# Patient Record
Sex: FEMALE | Race: WHITE | NOT HISPANIC OR LATINO
[De-identification: names, ages, dates, MRNs, and addresses within clinical notes are randomized per-mention and may not be internally consistent; named-entity substitution may affect disease eponyms.]

---

## 2018-04-23 ENCOUNTER — RX RENEWAL (OUTPATIENT)
Age: 83
End: 2018-04-23

## 2018-04-23 DIAGNOSIS — Z86.79 PERSONAL HISTORY OF OTHER DISEASES OF THE CIRCULATORY SYSTEM: ICD-10-CM

## 2018-04-23 PROBLEM — F32.9 DEPRESSION: Status: ACTIVE | Noted: 2018-04-23

## 2018-04-23 PROBLEM — I34.1 MVP (MITRAL VALVE PROLAPSE): Status: ACTIVE | Noted: 2018-04-23

## 2018-04-23 PROBLEM — F41.9 ANXIETY: Status: ACTIVE | Noted: 2018-04-23

## 2018-04-23 PROBLEM — Z86.69 HISTORY OF CATARACT: Status: RESOLVED | Noted: 2018-04-23 | Resolved: 2018-04-23

## 2018-04-23 PROBLEM — R55 SYNCOPE AND COLLAPSE: Status: RESOLVED | Noted: 2018-04-23 | Resolved: 2018-04-23

## 2018-04-23 RX ORDER — ROSUVASTATIN CALCIUM 5 MG/1
5 TABLET, FILM COATED ORAL DAILY
Refills: 0 | Status: ACTIVE | COMMUNITY
Start: 2017-09-14

## 2018-04-24 ENCOUNTER — APPOINTMENT (OUTPATIENT)
Dept: ELECTROPHYSIOLOGY | Facility: CLINIC | Age: 83
End: 2018-04-24
Payer: MEDICARE

## 2018-04-24 ENCOUNTER — OUTPATIENT (OUTPATIENT)
Dept: OUTPATIENT SERVICES | Facility: HOSPITAL | Age: 83
LOS: 1 days | Discharge: HOME | End: 2018-04-24
Payer: MEDICARE

## 2018-04-24 VITALS
OXYGEN SATURATION: 97 % | SYSTOLIC BLOOD PRESSURE: 119 MMHG | DIASTOLIC BLOOD PRESSURE: 78 MMHG | HEIGHT: 60 IN | WEIGHT: 150 LBS | HEART RATE: 60 BPM | BODY MASS INDEX: 29.45 KG/M2

## 2018-04-24 DIAGNOSIS — F41.9 ANXIETY DISORDER, UNSPECIFIED: ICD-10-CM

## 2018-04-24 DIAGNOSIS — Z95.0 PRESENCE OF CARDIAC PACEMAKER: ICD-10-CM

## 2018-04-24 DIAGNOSIS — Z86.69 PERSONAL HISTORY OF OTHER DISEASES OF THE NERVOUS SYSTEM AND SENSE ORGANS: ICD-10-CM

## 2018-04-24 DIAGNOSIS — I49.5 SICK SINUS SYNDROME: ICD-10-CM

## 2018-04-24 DIAGNOSIS — F32.9 MAJOR DEPRESSIVE DISORDER, SINGLE EPISODE, UNSPECIFIED: ICD-10-CM

## 2018-04-24 DIAGNOSIS — Z45.010 ENCOUNTER FOR CHECKING AND TESTING OF CARDIAC PACEMAKER PULSE GENERATOR [BATTERY]: ICD-10-CM

## 2018-04-24 DIAGNOSIS — I34.1 NONRHEUMATIC MITRAL (VALVE) PROLAPSE: ICD-10-CM

## 2018-04-24 DIAGNOSIS — R55 SYNCOPE AND COLLAPSE: ICD-10-CM

## 2018-04-24 PROCEDURE — 93010 ELECTROCARDIOGRAM REPORT: CPT

## 2018-04-24 PROCEDURE — 93000 ELECTROCARDIOGRAM COMPLETE: CPT | Mod: 59

## 2018-04-24 PROCEDURE — 99205 OFFICE O/P NEW HI 60 MIN: CPT

## 2018-04-24 PROCEDURE — 33228 REMV&REPLC PM GEN DUAL LEAD: CPT

## 2018-04-24 RX ORDER — SODIUM CHLORIDE 9 MG/ML
3 INJECTION INTRAMUSCULAR; INTRAVENOUS; SUBCUTANEOUS EVERY 8 HOURS
Qty: 0 | Refills: 0 | Status: DISCONTINUED | OUTPATIENT
Start: 2018-04-24 | End: 2018-05-09

## 2018-04-24 RX ORDER — ASPIRIN/CALCIUM CARB/MAGNESIUM 324 MG
1 TABLET ORAL
Qty: 0 | Refills: 0 | COMMUNITY

## 2018-04-24 RX ORDER — SERTRALINE 25 MG/1
225 TABLET, FILM COATED ORAL
Qty: 0 | Refills: 0 | COMMUNITY

## 2018-04-24 NOTE — H&P CARDIOLOGY - FAMILY HISTORY
Father  Still living? No  Family history of stomach cancer, Age at diagnosis: Age Unknown     Child  Still living? Yes, Estimated age: Age Unknown  Family history of malignant melanoma of skin, Age at diagnosis: Age Unknown  Family history of breast cancer, Age at diagnosis: Age Unknown     Sibling  Still living? Unknown  Family history of prostate cancer, Age at diagnosis: Age Unknown  Family history of non-Hodgkin's lymphoma, Age at diagnosis: Age Unknown

## 2018-04-24 NOTE — H&P CARDIOLOGY - PMH
Age-related macular degeneration  right eye  Appendicitis    History Cardiac arrhythmia    HTN    Hyperlipidemia    Irritable Bowel    Mitral valve prolapse  10-15 years ago  Shoulder sprain  right  Vertigo

## 2018-04-24 NOTE — H&P CARDIOLOGY - PSH
After cataract, bilateral    Cardiac pacemaker  7/9/10  Ovarian Cystectomy    S/P appendectomy    S/P knee replacement  right  2012

## 2018-04-24 NOTE — H&P CARDIOLOGY - HISTORY OF PRESENT ILLNESS
89 y/o woman with Hx of HTN, HLD, MVP, anxiety/depression, syncope, and sinus node dysfunction s/p dual chamber PPM placement who presents today for PPM gen change. See hard copy H&P from 4/24/18 in chart

## 2018-04-24 NOTE — CHART NOTE - NSCHARTNOTEFT_GEN_A_CORE
Diagnosis: Complete heart block  Operation: PPM generator change  Findings: The dual chamber PPM was disconnected and reconnected to a new generator.  Complications: None  Blood loss: <30cc  : Dr. GHULAM Newman

## 2018-04-26 PROBLEM — Z45.010 ELECTIVE REPLACEMENT INDICATED FOR CARDIAC PACEMAKER BATTERY AT END OF LIFESPAN: Status: ACTIVE | Noted: 2018-04-23

## 2018-04-26 PROBLEM — Z95.0 CARDIAC PACEMAKER: Status: ACTIVE | Noted: 2018-04-26

## 2018-04-26 RX ORDER — METOPROLOL SUCCINATE 25 MG/1
25 TABLET, EXTENDED RELEASE ORAL
Qty: 30 | Refills: 2 | Status: ACTIVE | COMMUNITY
Start: 2017-09-12

## 2018-04-29 ENCOUNTER — NON-APPOINTMENT (OUTPATIENT)
Age: 83
End: 2018-04-29

## 2018-05-10 ENCOUNTER — APPOINTMENT (OUTPATIENT)
Dept: ELECTROPHYSIOLOGY | Facility: CLINIC | Age: 83
End: 2018-05-10
Payer: MEDICARE

## 2018-05-10 VITALS — RESPIRATION RATE: 16 BRPM | SYSTOLIC BLOOD PRESSURE: 83 MMHG | HEART RATE: 60 BPM | DIASTOLIC BLOOD PRESSURE: 65 MMHG

## 2018-05-10 PROCEDURE — 99024 POSTOP FOLLOW-UP VISIT: CPT

## 2018-05-13 ENCOUNTER — TRANSCRIPTION ENCOUNTER (OUTPATIENT)
Age: 83
End: 2018-05-13

## 2018-05-29 ENCOUNTER — INPATIENT (INPATIENT)
Facility: HOSPITAL | Age: 83
LOS: 3 days | Discharge: NURSING, REHAB OR LONG-TERM CARE | DRG: 536 | End: 2018-06-02
Attending: ORTHOPAEDIC SURGERY | Admitting: ORTHOPAEDIC SURGERY
Payer: MEDICARE

## 2018-05-29 VITALS
DIASTOLIC BLOOD PRESSURE: 71 MMHG | HEART RATE: 64 BPM | RESPIRATION RATE: 16 BRPM | TEMPERATURE: 98 F | OXYGEN SATURATION: 99 % | SYSTOLIC BLOOD PRESSURE: 162 MMHG

## 2018-05-29 PROCEDURE — 70450 CT HEAD/BRAIN W/O DYE: CPT | Mod: 26

## 2018-05-29 PROCEDURE — 71045 X-RAY EXAM CHEST 1 VIEW: CPT | Mod: 26

## 2018-05-29 PROCEDURE — 73562 X-RAY EXAM OF KNEE 3: CPT | Mod: 26,RT

## 2018-05-29 PROCEDURE — 73502 X-RAY EXAM HIP UNI 2-3 VIEWS: CPT | Mod: 26,RT

## 2018-05-29 PROCEDURE — 70486 CT MAXILLOFACIAL W/O DYE: CPT | Mod: 26

## 2018-05-29 PROCEDURE — 72125 CT NECK SPINE W/O DYE: CPT | Mod: 26

## 2018-05-29 PROCEDURE — 99285 EMERGENCY DEPT VISIT HI MDM: CPT

## 2018-05-29 PROCEDURE — 73590 X-RAY EXAM OF LOWER LEG: CPT | Mod: 26,RT

## 2018-05-29 RX ORDER — ACETAMINOPHEN 500 MG
1000 TABLET ORAL ONCE
Qty: 0 | Refills: 0 | Status: COMPLETED | OUTPATIENT
Start: 2018-05-29 | End: 2018-05-29

## 2018-05-29 RX ORDER — TETANUS TOXOID, REDUCED DIPHTHERIA TOXOID AND ACELLULAR PERTUSSIS VACCINE, ADSORBED 5; 2.5; 8; 8; 2.5 [IU]/.5ML; [IU]/.5ML; UG/.5ML; UG/.5ML; UG/.5ML
0.5 SUSPENSION INTRAMUSCULAR ONCE
Qty: 0 | Refills: 0 | Status: COMPLETED | OUTPATIENT
Start: 2018-05-29 | End: 2018-05-29

## 2018-05-29 RX ADMIN — Medication 400 MILLIGRAM(S): at 21:20

## 2018-05-29 RX ADMIN — TETANUS TOXOID, REDUCED DIPHTHERIA TOXOID AND ACELLULAR PERTUSSIS VACCINE, ADSORBED 0.5 MILLILITER(S): 5; 2.5; 8; 8; 2.5 SUSPENSION INTRAMUSCULAR at 21:39

## 2018-05-29 NOTE — ED ADULT NURSE NOTE - OBJECTIVE STATEMENT
89 year old female patient BIBA in c-collar with head injury on ASA s/p trip and fall in kitchen. Patient states she was walking when her leg got caught on a chair and she fell down, hitting her hear. Patient denies LOC, and pressed her life alert button. EMS reports patient on floor for approximately 20 minutes prior to arrival. Patient has laceration noted above R eye - with bleeding controlled after bandaged by EMS. Patient A&Ox3, able to recall events leading up to fall. Skin tear noted to R knee, bleeding controlled. Unknown last teatanus. Patient denies CP, SOB, abd pain, n/v/d, fever, chills. Denies numbness or tingling - Able to move all extremities without difficulty. VSS. Family at bedside aware of plan of care.

## 2018-05-30 DIAGNOSIS — S72.009A FRACTURE OF UNSPECIFIED PART OF NECK OF UNSPECIFIED FEMUR, INITIAL ENCOUNTER FOR CLOSED FRACTURE: ICD-10-CM

## 2018-05-30 DIAGNOSIS — S43.409A UNSPECIFIED SPRAIN OF UNSPECIFIED SHOULDER JOINT, INITIAL ENCOUNTER: ICD-10-CM

## 2018-05-30 DIAGNOSIS — K58.9 IRRITABLE BOWEL SYNDROME WITHOUT DIARRHEA: ICD-10-CM

## 2018-05-30 DIAGNOSIS — I34.1 NONRHEUMATIC MITRAL (VALVE) PROLAPSE: ICD-10-CM

## 2018-05-30 LAB
24R-OH-CALCIDIOL SERPL-MCNC: 29.2 NG/ML — LOW (ref 30–80)
ALBUMIN SERPL ELPH-MCNC: 4.3 G/DL — SIGNIFICANT CHANGE UP (ref 3.3–5)
ALP SERPL-CCNC: 87 U/L — SIGNIFICANT CHANGE UP (ref 40–120)
ALT FLD-CCNC: 11 U/L — SIGNIFICANT CHANGE UP (ref 10–45)
ANION GAP SERPL CALC-SCNC: 13 MMOL/L — SIGNIFICANT CHANGE UP (ref 5–17)
ANION GAP SERPL CALC-SCNC: 14 MMOL/L — SIGNIFICANT CHANGE UP (ref 5–17)
APPEARANCE UR: CLEAR — SIGNIFICANT CHANGE UP
APTT BLD: 31.6 SEC — SIGNIFICANT CHANGE UP (ref 27.5–37.4)
AST SERPL-CCNC: 21 U/L — SIGNIFICANT CHANGE UP (ref 10–40)
BASOPHILS # BLD AUTO: 0 K/UL — SIGNIFICANT CHANGE UP (ref 0–0.2)
BASOPHILS NFR BLD AUTO: 0.5 % — SIGNIFICANT CHANGE UP (ref 0–2)
BILIRUB SERPL-MCNC: 0.4 MG/DL — SIGNIFICANT CHANGE UP (ref 0.2–1.2)
BILIRUB UR-MCNC: NEGATIVE — SIGNIFICANT CHANGE UP
BLD GP AB SCN SERPL QL: NEGATIVE — SIGNIFICANT CHANGE UP
BUN SERPL-MCNC: 46 MG/DL — HIGH (ref 7–23)
BUN SERPL-MCNC: 46 MG/DL — HIGH (ref 7–23)
CALCIUM SERPL-MCNC: 9.5 MG/DL — SIGNIFICANT CHANGE UP (ref 8.4–10.5)
CALCIUM SERPL-MCNC: 9.7 MG/DL — SIGNIFICANT CHANGE UP (ref 8.4–10.5)
CHLORIDE SERPL-SCNC: 100 MMOL/L — SIGNIFICANT CHANGE UP (ref 96–108)
CHLORIDE SERPL-SCNC: 98 MMOL/L — SIGNIFICANT CHANGE UP (ref 96–108)
CO2 SERPL-SCNC: 23 MMOL/L — SIGNIFICANT CHANGE UP (ref 22–31)
CO2 SERPL-SCNC: 26 MMOL/L — SIGNIFICANT CHANGE UP (ref 22–31)
COLOR SPEC: YELLOW — SIGNIFICANT CHANGE UP
CREAT SERPL-MCNC: 1.26 MG/DL — SIGNIFICANT CHANGE UP (ref 0.5–1.3)
CREAT SERPL-MCNC: 1.48 MG/DL — HIGH (ref 0.5–1.3)
DIFF PNL FLD: NEGATIVE — SIGNIFICANT CHANGE UP
EOSINOPHIL # BLD AUTO: 0.2 K/UL — SIGNIFICANT CHANGE UP (ref 0–0.5)
EOSINOPHIL NFR BLD AUTO: 2.9 % — SIGNIFICANT CHANGE UP (ref 0–6)
GLUCOSE SERPL-MCNC: 113 MG/DL — HIGH (ref 70–99)
GLUCOSE SERPL-MCNC: 120 MG/DL — HIGH (ref 70–99)
GLUCOSE UR QL: NEGATIVE — SIGNIFICANT CHANGE UP
HCT VFR BLD CALC: 32.6 % — LOW (ref 34.5–45)
HCT VFR BLD CALC: 35.3 % — SIGNIFICANT CHANGE UP (ref 34.5–45)
HGB BLD-MCNC: 11.1 G/DL — LOW (ref 11.5–15.5)
HGB BLD-MCNC: 12.1 G/DL — SIGNIFICANT CHANGE UP (ref 11.5–15.5)
INR BLD: 1.05 RATIO — SIGNIFICANT CHANGE UP (ref 0.88–1.16)
INR BLD: 1.08 RATIO — SIGNIFICANT CHANGE UP (ref 0.88–1.16)
KETONES UR-MCNC: NEGATIVE — SIGNIFICANT CHANGE UP
LEUKOCYTE ESTERASE UR-ACNC: ABNORMAL
LYMPHOCYTES # BLD AUTO: 1.2 K/UL — SIGNIFICANT CHANGE UP (ref 1–3.3)
LYMPHOCYTES # BLD AUTO: 22 % — SIGNIFICANT CHANGE UP (ref 13–44)
MCHC RBC-ENTMCNC: 29.7 PG — SIGNIFICANT CHANGE UP (ref 27–34)
MCHC RBC-ENTMCNC: 29.9 PG — SIGNIFICANT CHANGE UP (ref 27–34)
MCHC RBC-ENTMCNC: 33.9 GM/DL — SIGNIFICANT CHANGE UP (ref 32–36)
MCHC RBC-ENTMCNC: 34.1 GM/DL — SIGNIFICANT CHANGE UP (ref 32–36)
MCV RBC AUTO: 87.5 FL — SIGNIFICANT CHANGE UP (ref 80–100)
MCV RBC AUTO: 87.8 FL — SIGNIFICANT CHANGE UP (ref 80–100)
MONOCYTES # BLD AUTO: 0.3 K/UL — SIGNIFICANT CHANGE UP (ref 0–0.9)
MONOCYTES NFR BLD AUTO: 6.1 % — SIGNIFICANT CHANGE UP (ref 2–14)
NEUTROPHILS # BLD AUTO: 3.8 K/UL — SIGNIFICANT CHANGE UP (ref 1.8–7.4)
NEUTROPHILS NFR BLD AUTO: 68.5 % — SIGNIFICANT CHANGE UP (ref 43–77)
NITRITE UR-MCNC: NEGATIVE — SIGNIFICANT CHANGE UP
PH UR: 6 — SIGNIFICANT CHANGE UP (ref 5–8)
PLATELET # BLD AUTO: 117 K/UL — LOW (ref 150–400)
PLATELET # BLD AUTO: 131 K/UL — LOW (ref 150–400)
POTASSIUM SERPL-MCNC: 3.9 MMOL/L — SIGNIFICANT CHANGE UP (ref 3.5–5.3)
POTASSIUM SERPL-MCNC: 4.3 MMOL/L — SIGNIFICANT CHANGE UP (ref 3.5–5.3)
POTASSIUM SERPL-SCNC: 3.9 MMOL/L — SIGNIFICANT CHANGE UP (ref 3.5–5.3)
POTASSIUM SERPL-SCNC: 4.3 MMOL/L — SIGNIFICANT CHANGE UP (ref 3.5–5.3)
PROT SERPL-MCNC: 6.9 G/DL — SIGNIFICANT CHANGE UP (ref 6–8.3)
PROT UR-MCNC: SIGNIFICANT CHANGE UP
PROTHROM AB SERPL-ACNC: 11.3 SEC — SIGNIFICANT CHANGE UP (ref 9.8–12.7)
PROTHROM AB SERPL-ACNC: 11.7 SEC — SIGNIFICANT CHANGE UP (ref 9.8–12.7)
RBC # BLD: 3.73 M/UL — LOW (ref 3.8–5.2)
RBC # BLD: 4.03 M/UL — SIGNIFICANT CHANGE UP (ref 3.8–5.2)
RBC # FLD: 12.3 % — SIGNIFICANT CHANGE UP (ref 10.3–14.5)
RBC # FLD: 12.4 % — SIGNIFICANT CHANGE UP (ref 10.3–14.5)
RH IG SCN BLD-IMP: NEGATIVE — SIGNIFICANT CHANGE UP
SODIUM SERPL-SCNC: 136 MMOL/L — SIGNIFICANT CHANGE UP (ref 135–145)
SODIUM SERPL-SCNC: 138 MMOL/L — SIGNIFICANT CHANGE UP (ref 135–145)
SP GR SPEC: 1.02 — SIGNIFICANT CHANGE UP (ref 1.01–1.02)
UROBILINOGEN FLD QL: NEGATIVE — SIGNIFICANT CHANGE UP
WBC # BLD: 5.5 K/UL — SIGNIFICANT CHANGE UP (ref 3.8–10.5)
WBC # BLD: 6.5 K/UL — SIGNIFICANT CHANGE UP (ref 3.8–10.5)
WBC # FLD AUTO: 5.5 K/UL — SIGNIFICANT CHANGE UP (ref 3.8–10.5)
WBC # FLD AUTO: 6.5 K/UL — SIGNIFICANT CHANGE UP (ref 3.8–10.5)

## 2018-05-30 PROCEDURE — 76377 3D RENDER W/INTRP POSTPROCES: CPT | Mod: 26

## 2018-05-30 PROCEDURE — 72192 CT PELVIS W/O DYE: CPT | Mod: 26

## 2018-05-30 PROCEDURE — 78320: CPT | Mod: 26

## 2018-05-30 RX ORDER — LOSARTAN POTASSIUM 100 MG/1
100 TABLET, FILM COATED ORAL DAILY
Qty: 0 | Refills: 0 | Status: DISCONTINUED | OUTPATIENT
Start: 2018-05-30 | End: 2018-06-02

## 2018-05-30 RX ORDER — DOCUSATE SODIUM 100 MG
100 CAPSULE ORAL THREE TIMES A DAY
Qty: 0 | Refills: 0 | Status: DISCONTINUED | OUTPATIENT
Start: 2018-05-30 | End: 2018-06-02

## 2018-05-30 RX ORDER — HEPARIN SODIUM 5000 [USP'U]/ML
5000 INJECTION INTRAVENOUS; SUBCUTANEOUS EVERY 8 HOURS
Qty: 0 | Refills: 0 | Status: DISCONTINUED | OUTPATIENT
Start: 2018-05-30 | End: 2018-06-02

## 2018-05-30 RX ORDER — ONDANSETRON 8 MG/1
4 TABLET, FILM COATED ORAL EVERY 8 HOURS
Qty: 0 | Refills: 0 | Status: DISCONTINUED | OUTPATIENT
Start: 2018-05-30 | End: 2018-06-02

## 2018-05-30 RX ORDER — SERTRALINE 25 MG/1
225 TABLET, FILM COATED ORAL DAILY
Qty: 0 | Refills: 0 | Status: DISCONTINUED | OUTPATIENT
Start: 2018-05-30 | End: 2018-05-30

## 2018-05-30 RX ORDER — ALPRAZOLAM 0.25 MG
0.5 TABLET ORAL
Qty: 0 | Refills: 0 | Status: DISCONTINUED | OUTPATIENT
Start: 2018-05-30 | End: 2018-06-02

## 2018-05-30 RX ORDER — ACETAMINOPHEN 500 MG
975 TABLET ORAL EVERY 8 HOURS
Qty: 0 | Refills: 0 | Status: DISCONTINUED | OUTPATIENT
Start: 2018-05-30 | End: 2018-06-02

## 2018-05-30 RX ORDER — DIPHENOXYLATE HCL/ATROPINE 2.5-.025MG
1 TABLET ORAL EVERY 12 HOURS
Qty: 0 | Refills: 0 | Status: DISCONTINUED | OUTPATIENT
Start: 2018-05-30 | End: 2018-06-02

## 2018-05-30 RX ORDER — DIPHENOXYLATE HCL/ATROPINE 2.5-.025MG
1 TABLET ORAL
Qty: 0 | Refills: 0 | Status: DISCONTINUED | OUTPATIENT
Start: 2018-05-30 | End: 2018-06-02

## 2018-05-30 RX ORDER — ONDANSETRON 8 MG/1
4 TABLET, FILM COATED ORAL ONCE
Qty: 0 | Refills: 0 | Status: COMPLETED | OUTPATIENT
Start: 2018-05-30 | End: 2018-05-30

## 2018-05-30 RX ORDER — SODIUM CHLORIDE 9 MG/ML
1000 INJECTION INTRAMUSCULAR; INTRAVENOUS; SUBCUTANEOUS
Qty: 0 | Refills: 0 | Status: DISCONTINUED | OUTPATIENT
Start: 2018-05-30 | End: 2018-05-30

## 2018-05-30 RX ORDER — HYDROCHLOROTHIAZIDE 25 MG
25 TABLET ORAL DAILY
Qty: 0 | Refills: 0 | Status: DISCONTINUED | OUTPATIENT
Start: 2018-05-30 | End: 2018-06-02

## 2018-05-30 RX ORDER — ALPRAZOLAM 0.25 MG
0.5 TABLET ORAL ONCE
Qty: 0 | Refills: 0 | Status: DISCONTINUED | OUTPATIENT
Start: 2018-05-30 | End: 2018-05-30

## 2018-05-30 RX ORDER — SENNA PLUS 8.6 MG/1
2 TABLET ORAL AT BEDTIME
Qty: 0 | Refills: 0 | Status: DISCONTINUED | OUTPATIENT
Start: 2018-05-30 | End: 2018-06-02

## 2018-05-30 RX ORDER — GABAPENTIN 400 MG/1
100 CAPSULE ORAL THREE TIMES A DAY
Qty: 0 | Refills: 0 | Status: DISCONTINUED | OUTPATIENT
Start: 2018-05-30 | End: 2018-06-02

## 2018-05-30 RX ORDER — METOPROLOL TARTRATE 50 MG
25 TABLET ORAL DAILY
Qty: 0 | Refills: 0 | Status: DISCONTINUED | OUTPATIENT
Start: 2018-05-30 | End: 2018-06-02

## 2018-05-30 RX ORDER — ATORVASTATIN CALCIUM 80 MG/1
20 TABLET, FILM COATED ORAL AT BEDTIME
Qty: 0 | Refills: 0 | Status: DISCONTINUED | OUTPATIENT
Start: 2018-05-30 | End: 2018-06-02

## 2018-05-30 RX ORDER — SERTRALINE 25 MG/1
150 TABLET, FILM COATED ORAL DAILY
Qty: 0 | Refills: 0 | Status: DISCONTINUED | OUTPATIENT
Start: 2018-05-30 | End: 2018-06-02

## 2018-05-30 RX ADMIN — Medication 25 MILLIGRAM(S): at 06:12

## 2018-05-30 RX ADMIN — HEPARIN SODIUM 5000 UNIT(S): 5000 INJECTION INTRAVENOUS; SUBCUTANEOUS at 21:34

## 2018-05-30 RX ADMIN — Medication 1 TABLET(S): at 19:35

## 2018-05-30 RX ADMIN — ONDANSETRON 4 MILLIGRAM(S): 8 TABLET, FILM COATED ORAL at 06:11

## 2018-05-30 RX ADMIN — Medication 975 MILLIGRAM(S): at 21:35

## 2018-05-30 RX ADMIN — ONDANSETRON 4 MILLIGRAM(S): 8 TABLET, FILM COATED ORAL at 02:37

## 2018-05-30 RX ADMIN — GABAPENTIN 100 MILLIGRAM(S): 400 CAPSULE ORAL at 06:11

## 2018-05-30 RX ADMIN — Medication 100 MILLIGRAM(S): at 06:11

## 2018-05-30 RX ADMIN — SODIUM CHLORIDE 75 MILLILITER(S): 9 INJECTION INTRAMUSCULAR; INTRAVENOUS; SUBCUTANEOUS at 03:18

## 2018-05-30 RX ADMIN — Medication 100 MILLIGRAM(S): at 21:34

## 2018-05-30 RX ADMIN — Medication 1000 MILLIGRAM(S): at 02:36

## 2018-05-30 RX ADMIN — ATORVASTATIN CALCIUM 20 MILLIGRAM(S): 80 TABLET, FILM COATED ORAL at 21:34

## 2018-05-30 RX ADMIN — GABAPENTIN 100 MILLIGRAM(S): 400 CAPSULE ORAL at 21:34

## 2018-05-30 RX ADMIN — LOSARTAN POTASSIUM 100 MILLIGRAM(S): 100 TABLET, FILM COATED ORAL at 06:12

## 2018-05-30 RX ADMIN — Medication 0.5 MILLIGRAM(S): at 02:37

## 2018-05-30 RX ADMIN — SENNA PLUS 2 TABLET(S): 8.6 TABLET ORAL at 21:34

## 2018-05-30 NOTE — ED PROVIDER NOTE - NS ED ROS FT
No fever, no chills, no change in vision, no chest pain, no SOB, no cough, no abdominal pain, no nausea, no vomiting, no joint pain, no rashes, no focal neurologic complaints, no psychiatric issues, otherwise as HPI or negative

## 2018-05-30 NOTE — CHART NOTE - NSCHARTNOTEFT_GEN_A_CORE
Discussed with EP team (Ina), Pacemaker was recently Interrogated on 5/10/2018 at [facility], with full report in [employer].  PPM was initially implanted in April 2018.  No need for new interrogation prior to OR.

## 2018-05-30 NOTE — H&P ADULT - NSHPPHYSICALEXAM_GEN_ALL_CORE
R orbital hematoma with small laceration repaired by ED  R leg skin tear over lateral leg just distal to knee  Skin over hip intact  EHL/FHL/TA/GSC intact  SILT DP/SP/S/S/T

## 2018-05-30 NOTE — ED PROVIDER NOTE - NOTES
greater trochanteric fracture.  requesting MRI    KRYSTINA Christina MD greater trochanteric fracture.  requesting MRI, however, pt with PPM, will obtain CT    KRYSTINA Christina MD

## 2018-05-30 NOTE — H&P ADULT - ASSESSMENT
A/P: 89 year old female with R Greater trochanter fracture, possible intertrochanteric extension  - Admit to Ortho  - Follow up CT scan pelvis  - Follow up Bone Scan  - OR Planning  - Preoperative Labs: CBC, BMP, PT/INR, Type and Screen, UA, CXR, EKG  - Consent in chart: Patient consents for surgery, however did not want to physically sign paperwork and deferred signature to daughter. DaughterGabriela, signed consent paperwork after discussion with patient and patient's sonSamuel.  - Medicine Clearance  - NPO  - Hold anticoagulation for OR

## 2018-05-30 NOTE — CONSULT NOTE ADULT - SUBJECTIVE AND OBJECTIVE BOX
89 year old female presented to [facility] ED following fall at home. Patient was walking when her slippers became entangled with each other, causing her to trip and fall. She struck her head on the ground but did not lose consciousness. Following the fall, the patient experienced right hip pain and was unable to ambulate. No chest pain/SOB/dizziness/lightheadness prior to fall. Patient had recent PPM generator replacement 18. Patient seen now after bone scan resting comfortably      PAST MEDICAL & SURGICAL HISTORY:  Appendicitis  Shoulder sprain: right  Mitral valve prolapse: 10-15 years ago  Age-related macular degeneration: right eye  History Cardiac arrhythmia  Irritable Bowel  Vertigo  Hyperlipidemia  HTN  S/P knee replacement: right    S/P appendectomy  After cataract, bilateral  Cardiac pacemaker: 7/9/10  Ovarian Cystectomy    Soc Hx:   Tobacco: Neg  Etoh: Neg  Drugs: Neg    Family Hx:  As per the pat and daughter Non contributory      MEDICATIONS  (STANDING):  atorvastatin 20 milliGRAM(s) Oral at bedtime  docusate sodium 100 milliGRAM(s) Oral three times a day  gabapentin 100 milliGRAM(s) Oral three times a day  hydrochlorothiazide 25 milliGRAM(s) Oral daily  losartan 100 milliGRAM(s) Oral daily  metoprolol succinate ER 25 milliGRAM(s) Oral daily  senna 2 Tablet(s) Oral at bedtime  sertraline 150 milliGRAM(s) Oral daily    MEDICATIONS  (PRN):  acetaminophen   Tablet 975 milliGRAM(s) Oral every 8 hours PRN Mild Pain  ALPRAZolam 0.5 milliGRAM(s) Oral two times a day PRN Anxiety  diphenoxylate/atropine 1 Tablet(s) Oral four times a day PRN Diarrhea  ondansetron    Tablet 4 milliGRAM(s) Oral every 8 hours PRN Nausea and/or Vomiting        CONSTITUTIONAL: No weakness, fevers or chills  EYES/ENT: No visual changes;  No vertigo or throat pain   NECK: No pain or stiffness  RESPIRATORY: No cough, wheezing, hemoptysis; No shortness of breath  CARDIOVASCULAR: No chest pain or palpitations  GASTROINTESTINAL: No abdominal or epigastric pain. No nausea, vomiting, or hematemesis; No diarrhea or constipation. No melena or hematochezia.  GENITOURINARY: No dysuria, frequency or hematuria  NEUROLOGICAL: No numbness or weakness  SKIN: No itching, burning, rashes, or lesions   All other review of systems is negative unless indicated above.    INTERVAL HPI/OVERNIGHT EVENTS:  T(C): 37 (18 @ 16:08), Max: 37 (18 @ 16:08)  HR: 60 (18 @ 16:08) (60 - 64)  BP: 158/78 (18 @ 16:08) (133/74 - 165/84)  RR: 18 (18 @ 16:08) (16 - 20)  SpO2: 96% (18 @ 16:08) (96% - 99%)  Wt(kg): --  I&O's Summary      PHYSICAL EXAM:  GENERAL: NAD, well-groomed, well-developed  HEAD:  Atraumatic, Normocephalic  EYES: EOMI, PERRLA, conjunctiva and sclera clear  ENMT: No tonsillar erythema, exudates, or enlargement; Moist mucous membranes, Good dentition, No lesions  NECK: Supple, No JVD, Normal thyroid  NERVOUS SYSTEM:  Alert & Oriented X3, Good concentration; Motor Strength 5/5 B/L upper and lower extremities; DTRs 2+ intact and symmetric  CHEST/LUNG: Clear to percussion bilaterally; No rales, rhonchi, wheezing, or rubs  HEART: Regular rate and rhythm; No murmurs, rubs, or gallops  ABDOMEN: Soft, Nontender, Nondistended; Bowel sounds present  EXTREMITIES:  2+ Peripheral Pulses, No clubbing, cyanosis, or edema  LYMPH: No lymphadenopathy noted  SKIN: No rashes or lesions        LABS:                        11.1   6.5   )-----------( 117      ( 30 May 2018 06:15 )             32.6         136  |  100  |  46<H>  ----------------------------<  120<H>  4.3   |  23  |  1.26    Ca    9.7      30 May 2018 06:14    TPro  6.9  /  Alb  4.3  /  TBili  0.4  /  DBili  x   /  AST  21  /  ALT  11  /  AlkPhos  87      PT/INR - ( 30 May 2018 06:34 )   PT: 11.7 sec;   INR: 1.08 ratio         PTT - ( 30 May 2018 06:34 )  PTT:30.6 sec  Urinalysis Basic - ( 30 May 2018 03:18 )    Color: Yellow / Appearance: Clear / S.020 / pH: x  Gluc: x / Ketone: Negative  / Bili: Negative / Urobili: Negative   Blood: x / Protein: Trace / Nitrite: Negative   Leuk Esterase: Large / RBC: 0-2 /HPF / WBC 26-50 /HPF   Sq Epi: x / Non Sq Epi: OCC /HPF / Bacteria: Few /HPF      CAPILLARY BLOOD GLUCOSE      POCT Blood Glucose.: 123 mg/dL (29 May 2018 21:46)        Urinalysis Basic - ( 30 May 2018 03:18 )    Color: Yellow / Appearance: Clear / S.020 / pH: x  Gluc: x / Ketone: Negative  / Bili: Negative / Urobili: Negative   Blood: x / Protein: Trace / Nitrite: Negative   Leuk Esterase: Large / RBC: 0-2 /HPF / WBC 26-50 /HPF   Sq Epi: x / Non Sq Epi: OCC /HPF / Bacteria: Few /HPF      EKG  < from: 12 Lead ECG (18 @ 02:16) >  Ventricular Rate 60 BPM    Atrial Rate 60 BPM    P-R Interval 104 ms    QRS Duration 178 ms     ms    QTc 494 ms    R Axis -81 degrees    T Axis 95 degrees    Diagnosis Line AV SEQUENTIAL OR DUAL CHAMBER ELECTRONIC PACEMAKER    < end of copied text >

## 2018-05-30 NOTE — ED PROVIDER NOTE - MEDICAL DECISION MAKING DETAILS
good story for mechanical fall with head trauma.  no loc.  asa 81mg use. wound to RLE.  CTH/max-face, c-spine, cxr, pelvis xr, RLE xrays, ekg, tdap, analgesia, reassess ***Attending Rodrigo Christina MD*** good story for mechanical fall with head trauma.  no loc.  asa 81mg use. wound to RLE.  CTH/max-face, c-spine, cxr, pelvis xr, RLE xrays, ekg, tdap, analgesia, reassess

## 2018-05-30 NOTE — ED PROVIDER NOTE - CARE PLAN
Principal Discharge DX:	Hip fracture Principal Discharge DX:	Hip fracture  Goal:	greater trochanteric fracture

## 2018-05-30 NOTE — ED PROVIDER NOTE - PHYSICAL EXAMINATION
A+Ox4, ABCs intact.  GCS15.  approx 4 cm laceration over orbital rim with swelling and dried blood, no stepoff, crepitus.  PERRL b/l, eomi b/l, no midline spinal ttp.  CTAB.  s1s2, holosystolic murmur.  PPM in place with well healing scar, abd soft ndnt.  mild R hip ttp.  5/5 strength b/l throughout.  2+ radial and DP pulse b/l

## 2018-05-30 NOTE — H&P ADULT - HISTORY OF PRESENT ILLNESS
89 year old female presented to [facility] ED following fall at home. Patient was walking when her slippers became entangled with each other, causing her to trip and fall. She struck her head on the ground but did not lose consciousness. Following the fall, the patient experienced right hip pain and was unable to ambulate. No chest pain/SOB/dizziness/lightheadness prior to fall. Patient had recent PPM generator replacement 4/24/18.    PPM: [employer] Accolage MRI EL DR Pacemaker Model L331 Serial Number [id number]

## 2018-05-30 NOTE — ED PROVIDER NOTE - OBJECTIVE STATEMENT
89F hx HTN, HLD, vertigo, bradycardia s/p PPM placement presents with head trauma and RLE wound.  Pt good historian reports tripping over chair leg while trying to rise from a seated position. No LOC, called "life alert" immediately after fall.  EMS arrived, pt conscious on the ground will bleeding facial laceration over R superior orbital rim.  did not ambulate at scene.  Pt reports mild head/facial pain.  Denies visual changes, neck pain, nausea/vomiting, chest pain, SOB, back pain, extremity pain/paresthesias, weakness. ***Attending Rodrigo Christina MD*** 89F hx HTN, HLD, vertigo, bradycardia s/p PPM placement presents with head trauma and RLE wound.  Pt good historian reports tripping over chair leg while trying to rise from a seated position. No LOC, called "life alert" immediately after fall.  EMS arrived, pt conscious on the ground will bleeding facial laceration over R superior orbital rim.  did not ambulate at scene.  Pt reports mild head/facial pain.  Denies visual changes, neck pain, nausea/vomiting, chest pain, SOB, back pain, extremity pain/paresthesias, weakness.

## 2018-05-30 NOTE — H&P ADULT - FAMILY HISTORY
Family history of stomach cancer, father     Child  Still living? Yes, Estimated age: Age Unknown  Family history of malignant melanoma of skin, Age at diagnosis: Age Unknown  Family history of breast cancer, Age at diagnosis: Age Unknown     Sibling  Still living? Unknown  Family history of prostate cancer, Age at diagnosis: Age Unknown  Family history of non-Hodgkin's lymphoma, Age at diagnosis: Age Unknown

## 2018-05-31 DIAGNOSIS — S01.91XA LACERATION WITHOUT FOREIGN BODY OF UNSPECIFIED PART OF HEAD, INITIAL ENCOUNTER: ICD-10-CM

## 2018-05-31 PROCEDURE — 73110 X-RAY EXAM OF WRIST: CPT | Mod: 26,RT

## 2018-05-31 RX ORDER — SODIUM CHLORIDE 9 MG/ML
500 INJECTION INTRAMUSCULAR; INTRAVENOUS; SUBCUTANEOUS ONCE
Qty: 0 | Refills: 0 | Status: COMPLETED | OUTPATIENT
Start: 2018-05-31 | End: 2018-05-31

## 2018-05-31 RX ADMIN — GABAPENTIN 100 MILLIGRAM(S): 400 CAPSULE ORAL at 13:49

## 2018-05-31 RX ADMIN — HEPARIN SODIUM 5000 UNIT(S): 5000 INJECTION INTRAVENOUS; SUBCUTANEOUS at 13:49

## 2018-05-31 RX ADMIN — HEPARIN SODIUM 5000 UNIT(S): 5000 INJECTION INTRAVENOUS; SUBCUTANEOUS at 06:07

## 2018-05-31 RX ADMIN — Medication 25 MILLIGRAM(S): at 06:07

## 2018-05-31 RX ADMIN — Medication 25 MILLIGRAM(S): at 06:08

## 2018-05-31 RX ADMIN — Medication 100 MILLIGRAM(S): at 06:08

## 2018-05-31 RX ADMIN — GABAPENTIN 100 MILLIGRAM(S): 400 CAPSULE ORAL at 06:07

## 2018-05-31 RX ADMIN — Medication 0.5 MILLIGRAM(S): at 21:21

## 2018-05-31 RX ADMIN — LOSARTAN POTASSIUM 100 MILLIGRAM(S): 100 TABLET, FILM COATED ORAL at 06:08

## 2018-05-31 RX ADMIN — ATORVASTATIN CALCIUM 20 MILLIGRAM(S): 80 TABLET, FILM COATED ORAL at 21:08

## 2018-05-31 RX ADMIN — SERTRALINE 150 MILLIGRAM(S): 25 TABLET, FILM COATED ORAL at 11:45

## 2018-05-31 RX ADMIN — SODIUM CHLORIDE 500 MILLILITER(S): 9 INJECTION INTRAMUSCULAR; INTRAVENOUS; SUBCUTANEOUS at 15:23

## 2018-05-31 RX ADMIN — HEPARIN SODIUM 5000 UNIT(S): 5000 INJECTION INTRAVENOUS; SUBCUTANEOUS at 21:08

## 2018-05-31 RX ADMIN — GABAPENTIN 100 MILLIGRAM(S): 400 CAPSULE ORAL at 21:08

## 2018-05-31 NOTE — PHYSICAL THERAPY INITIAL EVALUATION ADULT - ACTIVE RANGE OF MOTION EXAMINATION, REHAB EVAL
Decreased R shoulder flexion/Left UE Active ROM was WNL (within normal limits)/bilateral  lower extremity Active ROM was WFL (within functional limits)

## 2018-05-31 NOTE — PHYSICAL THERAPY INITIAL EVALUATION ADULT - BALANCE TRAINING, PT EVAL
GOAL: Pt's static/dynamic sitting/standing balance will improve to fair plus to increase stability, and decrease risk for falls when ambulating or performing ADL's

## 2018-05-31 NOTE — PHYSICAL THERAPY INITIAL EVALUATION ADULT - ADDITIONAL COMMENTS
Xray pelvis: There is an acute superiorly displaced avulsion fracture of the right greater trochanter  Xray tib/fib: Negative fx  CT pelvis: Displaced right greater trochanteric fracture with mild comminution  NM bone scan: It is not possible to determine the extent of fracture.

## 2018-05-31 NOTE — PHYSICAL THERAPY INITIAL EVALUATION ADULT - STRENGTHENING, PT EVAL
GOAL: Pt will improved B UE/LE strength to for increased limb stability, to improve gait, and facilitate stair negotiation in 4 weeks

## 2018-05-31 NOTE — PHYSICAL THERAPY INITIAL EVALUATION ADULT - GAIT DEVIATIONS NOTED, PT EVAL
unsteady, antalgic gait, fatigued, + nausea. BP drop from 134/64 supine to 97/54 after walk/decreased step length/decreased weight-shifting ability/decreased evelyn/decreased stride length

## 2018-05-31 NOTE — PHYSICAL THERAPY INITIAL EVALUATION ADULT - PERTINENT HX OF CURRENT PROBLEM, REHAB EVAL
89 year old female presented to [facility] ED following fall at home. Patient was walking when her slippers became entangled with each other, causing her to trip and fall. She struck her head on the ground but did not lose consciousness. Following the fall, the patient experienced right hip pain and was unable to ambulate. CT head and cspine negative. CT face:  No facial or orbital fracture. Small to moderate-sized hematoma overlying the superior lateral right preorbital tissues. CXR clear.

## 2018-06-01 ENCOUNTER — TRANSCRIPTION ENCOUNTER (OUTPATIENT)
Age: 83
End: 2018-06-01

## 2018-06-01 DIAGNOSIS — S52.501A UNSPECIFIED FRACTURE OF THE LOWER END OF RIGHT RADIUS, INITIAL ENCOUNTER FOR CLOSED FRACTURE: ICD-10-CM

## 2018-06-01 PROCEDURE — 72170 X-RAY EXAM OF PELVIS: CPT | Mod: 26

## 2018-06-01 RX ORDER — SENNA PLUS 8.6 MG/1
2 TABLET ORAL
Qty: 0 | Refills: 0 | COMMUNITY
Start: 2018-06-01

## 2018-06-01 RX ORDER — ACETAMINOPHEN 500 MG
3 TABLET ORAL
Qty: 0 | Refills: 0 | DISCHARGE
Start: 2018-06-01

## 2018-06-01 RX ORDER — LOSARTAN POTASSIUM 100 MG/1
1 TABLET, FILM COATED ORAL
Qty: 0 | Refills: 0 | DISCHARGE
Start: 2018-06-01

## 2018-06-01 RX ORDER — ASPIRIN/CALCIUM CARB/MAGNESIUM 324 MG
1 TABLET ORAL
Qty: 0 | Refills: 0 | COMMUNITY

## 2018-06-01 RX ORDER — ACETAMINOPHEN 500 MG
3 TABLET ORAL
Qty: 0 | Refills: 0 | COMMUNITY
Start: 2018-06-01

## 2018-06-01 RX ORDER — DOCUSATE SODIUM 100 MG
1 CAPSULE ORAL
Qty: 0 | Refills: 0 | COMMUNITY
Start: 2018-06-01

## 2018-06-01 RX ADMIN — Medication 0.5 MILLIGRAM(S): at 22:46

## 2018-06-01 RX ADMIN — Medication 25 MILLIGRAM(S): at 05:35

## 2018-06-01 RX ADMIN — SENNA PLUS 2 TABLET(S): 8.6 TABLET ORAL at 22:41

## 2018-06-01 RX ADMIN — HEPARIN SODIUM 5000 UNIT(S): 5000 INJECTION INTRAVENOUS; SUBCUTANEOUS at 22:41

## 2018-06-01 RX ADMIN — SERTRALINE 150 MILLIGRAM(S): 25 TABLET, FILM COATED ORAL at 13:13

## 2018-06-01 RX ADMIN — GABAPENTIN 100 MILLIGRAM(S): 400 CAPSULE ORAL at 13:13

## 2018-06-01 RX ADMIN — GABAPENTIN 100 MILLIGRAM(S): 400 CAPSULE ORAL at 05:35

## 2018-06-01 RX ADMIN — GABAPENTIN 100 MILLIGRAM(S): 400 CAPSULE ORAL at 22:41

## 2018-06-01 RX ADMIN — ATORVASTATIN CALCIUM 20 MILLIGRAM(S): 80 TABLET, FILM COATED ORAL at 22:41

## 2018-06-01 RX ADMIN — LOSARTAN POTASSIUM 100 MILLIGRAM(S): 100 TABLET, FILM COATED ORAL at 05:35

## 2018-06-01 RX ADMIN — HEPARIN SODIUM 5000 UNIT(S): 5000 INJECTION INTRAVENOUS; SUBCUTANEOUS at 05:35

## 2018-06-01 RX ADMIN — Medication 975 MILLIGRAM(S): at 13:14

## 2018-06-01 RX ADMIN — HEPARIN SODIUM 5000 UNIT(S): 5000 INJECTION INTRAVENOUS; SUBCUTANEOUS at 13:14

## 2018-06-01 NOTE — DISCHARGE NOTE ADULT - PLAN OF CARE
Painless ambulation. Return to normal activites. Follow up with Dr. Cabezas upon discharge from Rehab facility. Call office to schedule appointment.  Activity: Weight Bearing as tolerated on Right and Left Lower Extremity.                NON-weight bearing on Right UPPER Extremity. Painless ambulation. Return to normal activities. Follow up with Dr. Cabezas upon discharge from Rehab facility. Call office to schedule appointment.  Activity: Weight Bearing as tolerated on Right and Left Lower Extremity.              NON-weight Bearing on Right UPPER extremity.   DVT prophylaxis: Take Lovenox 40mg SQ daily for 2 WEEKS, followed by Aspirin 325mg Orally Twice Daily for 4 weeks, then resume normal home dose of Aspirin 81mg. Follow up with Dr. Cabezas in TWO WEEKS -->Call office to schedule appointment.  Activity: Weight Bearing as tolerated on Right and Left Lower Extremity.              Right UPPER Extremity: NON-weight Bearing. Keep cast clean and dry. See Dr. Cabezas in 2 WEEKS.   DVT prophylaxis: Take Lovenox 40mg SQ daily for 2 WEEKS, followed by Aspirin 325mg Orally Twice Daily for 4 weeks, then resume normal home dose of Aspirin 81mg.  Laceration Sutures: Remove sutures from laceration above right eyebrow on June 4, 2018 and apply steristrips. Follow up with Dr. Cabezas in TWO WEEKS -->Call office to schedule appointment.  Activity: Weight Bearing as tolerated on Right and Left Lower Extremities.              Right UPPER Extremity: NON-weight Bearing. Keep cast clean and dry. See Dr. Cabezas in 2 WEEKS.   DVT prophylaxis: Take Lovenox 40mg SQ daily for 2 WEEKS, followed by Aspirin 325mg Orally Twice Daily for 4 weeks, then resume normal home dose of Aspirin 81mg.  Laceration Sutures: Remove sutures from laceration above right eyebrow on June 4, 2018 and apply steristrips.

## 2018-06-01 NOTE — DISCHARGE NOTE ADULT - ADDITIONAL INSTRUCTIONS
Rehab staff to Remove Sutures above Right Eyebrow on 6/4/18 and apply steristrips. Rehab staff to Remove Sutures above Right Eyebrow on 6/4/18 and apply steristrips.  Follow up with Dr. Cabezas in 2 weeks to discuss Right wrist CAST/Rehab/Medications.   It is highly recommended you follow up with your Primary Care Physician within 4 weeks of discharge from Rehab to discuss recent hospitalization/general checkup/possible medication adjustment.

## 2018-06-01 NOTE — OCCUPATIONAL THERAPY INITIAL EVALUATION ADULT - ADL RETRAINING, OT EVAL
GOAL: Pt will perform LB dressing independently in two weeks GOAL: Pt will be independent with toileting in two weeks

## 2018-06-01 NOTE — DISCHARGE NOTE ADULT - MEDICATION SUMMARY - MEDICATIONS TO TAKE
I will START or STAY ON the medications listed below when I get home from the hospital:    aspirin 325 mg oral tablet  -- 1 tab(s) by mouth 2 times a day FOR FOUR WEEKS AFTER FINISH LOVENOX   -- Indication: For DVT ppx    acetaminophen 325 mg oral tablet  -- 3 tab(s) by mouth every 8 hours, As needed FOR Pain  -- Indication: For Pain    losartan 100 mg oral tablet  -- 1 tab(s) by mouth once a day  -- Indication: For Hypertension    Lovenox 40 mg/0.4 mL injectable solution  -- 40 milligram(s) injectable once a day for 2 WEEKS, FOLLOWED BY ASPIRIN 325MG  -- Indication: For DVT ppx    gabapentin 100 mg oral capsule  -- 1 cap(s) by mouth 3 times a day  -- Indication: For Pain    Zoloft  -- 150 milligram(s) by mouth once a day (at bedtime)  -- Indication: For Depression    Lomotil 2.5 mg-0.025 mg oral tablet  -- 2 tab(s) by mouth 4 times a day, As Needed  -- Indication: For GI agent    Dramamine 50 mg oral tablet  -- 1 tab(s) by mouth once a day, As Needed  -- Indication: For Antivertigo agent    Crestor 5 mg oral tablet  -- 1 tab(s) by mouth once a day (at bedtime)  -- Indication: For Antihyperlipidemic    losartan-hydroCHLOROthiazide 100 mg-25 mg oral tablet  -- 1 tab(s) by mouth once a day  -- Indication: For Hypertension    Xanax 0.5 mg oral tablet  -- 1 tab(s) by mouth 2 times a day, As Needed  -- Indication: For Anxiety    metoprolol succinate 25 mg oral tablet, extended release  -- 1 tab(s) by mouth once a day  -- Indication: For Hypertension    Radha-C 500 mg oral tablet  -- 1 tab(s) by mouth once a day  -- Indication: For Supplement    vitamin E 400 intl units oral capsule  -- 1 cap(s) by mouth once a day  -- Indication: For Supplement    beta-carotene 25,000 units oral capsule  -- 1 cap(s) by mouth once a day  -- Indication: For Supplement    Vitamin D3 1000 intl units oral tablet  -- 1 tab(s) by mouth once a day  -- Indication: For Supplement

## 2018-06-01 NOTE — CHART NOTE - NSCHARTNOTEFT_GEN_A_CORE
Ortho PA Note    Asked by patients nurse to see patient after patient complaining of cast for right distal radius fx. Pt requesting it be removed or shortened because its   "uncomfortable" and she can't grab her coffee or things with her right hand.   Pt explained in detail she needs cast and it cannot be removed or shortened. Patient expressed dissatisfaction with   no shortening or removal of cast.   Offered reassurance cast is not tight and size is appropriate multiple times.         JEWEL John  Orthopedic Surgery  [phone]

## 2018-06-01 NOTE — OCCUPATIONAL THERAPY INITIAL EVALUATION ADULT - ADDITIONAL COMMENTS
XR R wrist 5/31 Mildly impacted transverse fracture in the distal radial metaphysis.  XR Pelvis Superiorly displaced avulsion fracture of the right greater trochanter, similar in appearance to prior studies.

## 2018-06-01 NOTE — DISCHARGE NOTE ADULT - OTHER SIGNIFICANT FINDINGS
< from: Xray Wrist 3 Views, Right (05.31.18 @ 18:50) >  EXAM:  WRIST COMPLETE RIGHT-MIN 3 VIEWS                          PROCEDURE DATE:  05/31/2018      INTERPRETATION:      CLINICAL INDICATION: Right wrist pain status post mechanical fall.   Question right distal radius fracture.  TECHNIQUE: PA, scaphoid, oblique, and lateral radiographs of the right   wrist.    COMPARISON: None available.      IMPRESSION:  1.  Mildly impacted transverse fracture in the distal radial metaphysis.      < end of copied text >      < from: Xray Pelvis AP only (06.01.18 @ 10:23) >  EXAM:  PELVIS AP ONLY                            PROCEDURE DATE:  06/01/2018      CLINICAL INDICATION: Right greater trochanter fracture.  TECHNIQUE: AP pelvis    COMPARISON: Pelvic CT 30 May 2018. Pelvic radiographs 29 May 2018.    FINDINGS:    As seen on prior radiographs, there is a superiorly displaced fracture at   the base of the right greater trochanter. There does not appear to be   extension into the femoral neck or intertrochanteric region. No   additional fractures identified. Multilevel spondylosis of the lumbar   spine. Degenerative change of the pubic symphysis. Cartilage space   narrowing and marginal osteophyte formation of the bilateral hips.      IMPRESSION:  1.  Superiorly displaced avulsion fracture of the right greater   trochanter, similar in appearance to prior studies.    < end of copied text >

## 2018-06-01 NOTE — DISCHARGE NOTE ADULT - HOSPITAL COURSE
Admit: 5/30/18  Dx: Right Greater Trochanter Fracture  Procedure:   Surgeon: Dr. Cabezas    History of Present Illness:  Reason for Admission: R Greater Trochanter Fracture	  History of Present Illness: 	  89 year old female presented to [facility] ED following fall at home. Patient was walking when her slippers became entangled with each other, causing her to trip and fall. She struck her head on the ground but did not lose consciousness. Following the fall, the patient experienced right hip pain and was unable to ambulate. No chest pain/SOB/dizziness/lightheadness prior to fall. Patient had recent PPM generator replacement 4/24/18.    PPM: [email or web address] Accolage MRI EL DR Pacemaker Model L331 Serial Number [id number]     Review of Systems:  Other Review of Systems: All other review of systems negative, except as noted in HPI	      Allergies and Intolerances:        Allergies:  	penicillins: Drug Category, Anaphylaxis  	PCN: Miscellaneous, Anaphylaxis, PCN  	Motrin: Miscellaneous, Nausea, Motrin  	Nitroglycerine-passed out: Miscellaneous, Other, Nitroglycerine-passed out  	codeine: Drug, Other, unsure    Home Medications:   * Patient Currently Takes Medications as of 24-Apr-2018 15:25 documented in Structured Notes  · 	Discharge Instructions: Follow up with Dr. Newman as instructed.  	Taked 650mg Tylenol every 6 hours as needed for pain.  · 	doxycycline hyclate 100 mg oral tablet: 1 tab(s) orally 2 times a day   · 	Xanax 0.5 mg oral tablet: 1 tab(s) orally 2 times a day, As Needed  · 	Crestor 5 mg oral tablet: 1 tab(s) orally once a day (at bedtime)  · 	losartan-hydroCHLOROthiazide 100 mg-25 mg oral tablet: 1 tab(s) orally once a day  · 	Lomotil 2.5 mg-0.025 mg oral tablet: 2 tab(s) orally 4 times a day, As Needed  · 	metoprolol succinate 25 mg oral tablet, extended release: 1 tab(s) orally once a day  · 	gabapentin 100 mg oral capsule: 1 cap(s) orally 3 times a day  · 	Dramamine 50 mg oral tablet: 1 tab(s) orally once a day, As Needed  · 	Radha-C 500 mg oral tablet: 1 tab(s) orally once a day  · 	vitamin E 400 intl units oral capsule: 1 cap(s) orally once a day  · 	beta-carotene 25,000 units oral capsule: 1 cap(s) orally once a day  · 	Vitamin D3 1000 intl units oral tablet: 1 tab(s) orally once a day  · 	Zoloft: 150 milligram(s) orally once a day (at bedtime)  · 	aspirin 81 mg oral tablet: 1 tab(s) orally every other day (at bedtime)    Patient History:    Past Medical History:  Age-related macular degeneration  right eye  Appendicitis    History Cardiac arrhythmia    HTN    Hyperlipidemia    Irritable Bowel    Mitral valve prolapse  10-15 years ago  Shoulder sprain  right  Vertigo.     Past Surgical History:  After cataract, bilateral    Cardiac pacemaker  7/9/10  Ovarian Cystectomy    S/P appendectomy    S/P knee replacement  right  2012. Admit: 5/30/18  Dx: Right Greater Trochanter Fracture  Procedure:   Surgeon: Dr. Cabezas    History of Present Illness:  Reason for Admission: R Greater Trochanter Fracture	  History of Present Illness: 	  89 year old female presented to [facility] ED following fall at home. Patient was walking when her slippers became entangled with each other, causing her to trip and fall. She struck her head on the ground but did not lose consciousness. Following the fall, the patient experienced right hip pain and was unable to ambulate. No chest pain/SOB/dizziness/lightheadness prior to fall. Patient had recent PPM generator replacement 4/24/18.    PPM: [employer] Accolage MRI EL DR Pacemaker Model L331 Serial Number [id number]     Review of Systems:  Other Review of Systems: All other review of systems negative, except as noted in HPI	      Allergies and Intolerances:        Allergies:  	penicillins: Drug Category, Anaphylaxis  	PCN: Miscellaneous, Anaphylaxis, PCN  	Motrin: Miscellaneous, Nausea, Motrin  	Nitroglycerine-passed out: Miscellaneous, Other, Nitroglycerine-passed out  	codeine: Drug, Other, unsure    Home Medications:   * Patient Currently Takes Medications as of 24-Apr-2018 15:25 documented in Structured Notes  · 	Discharge Instructions: Follow up with Dr. Newman as instructed.  	Taked 650mg Tylenol every 6 hours as needed for pain.  · 	doxycycline hyclate 100 mg oral tablet: 1 tab(s) orally 2 times a day   · 	Xanax 0.5 mg oral tablet: 1 tab(s) orally 2 times a day, As Needed  · 	Crestor 5 mg oral tablet: 1 tab(s) orally once a day (at bedtime)  · 	losartan-hydroCHLOROthiazide 100 mg-25 mg oral tablet: 1 tab(s) orally once a day  · 	Lomotil 2.5 mg-0.025 mg oral tablet: 2 tab(s) orally 4 times a day, As Needed  · 	metoprolol succinate 25 mg oral tablet, extended release: 1 tab(s) orally once a day  · 	gabapentin 100 mg oral capsule: 1 cap(s) orally 3 times a day  · 	Dramamine 50 mg oral tablet: 1 tab(s) orally once a day, As Needed  · 	Radha-C 500 mg oral tablet: 1 tab(s) orally once a day  · 	vitamin E 400 intl units oral capsule: 1 cap(s) orally once a day  · 	beta-carotene 25,000 units oral capsule: 1 cap(s) orally once a day  · 	Vitamin D3 1000 intl units oral tablet: 1 tab(s) orally once a day  · 	Zoloft: 150 milligram(s) orally once a day (at bedtime)  · 	aspirin 81 mg oral tablet: 1 tab(s) orally every other day (at bedtime)    Patient History:    Past Medical History:  Age-related macular degeneration  right eye  Appendicitis    History Cardiac arrhythmia    HTN    Hyperlipidemia    Irritable Bowel    Mitral valve prolapse  10-15 years ago  Shoulder sprain  right  Vertigo.     Past Surgical History:  After cataract, bilateral    Cardiac pacemaker  7/9/10  Ovarian Cystectomy    S/P appendectomy    S/P knee replacement  right  2012.    Hospital Course:  5/30/18: Patient presented to [facility] S/P Fall at home. In the emergency room found to have a Right Greater Trochanter Fracture. A bone scan was ordered to evaluate extension of the fracture. After the bone scan the orthopaedic service found fracture not in need of surgical fixation.    5/31/18: Physical Therapy Team evaluated and treated the patient whom recommended Rehab for discharge disposition.   X-ray of right wrist done revealed distal radius fracture. Patient placed in cast. Admit: 5/30/18  Dx: Right Greater Trochanter Fracture  Procedure:   Surgeon: Dr. Cabezas    History of Present Illness:  Reason for Admission: R Greater Trochanter Fracture	  History of Present Illness: 	  89 year old female presented to [facility] ED following fall at home. Patient was walking when her slippers became entangled with each other, causing her to trip and fall. She struck her head on the ground but did not lose consciousness. Following the fall, the patient experienced right hip pain and was unable to ambulate. No chest pain/SOB/dizziness/lightheadness prior to fall. Patient had recent PPM generator replacement 4/24/18.    PPM: [employer] Accolage MRI EL DR Pacemaker Model L331 Serial Number [id number]     Review of Systems:  Other Review of Systems: All other review of systems negative, except as noted in HPI	      Allergies and Intolerances:        Allergies:  	penicillins: Drug Category, Anaphylaxis  	PCN: Miscellaneous, Anaphylaxis, PCN  	Motrin: Miscellaneous, Nausea, Motrin  	Nitroglycerine-passed out: Miscellaneous, Other, Nitroglycerine-passed out  	codeine: Drug, Other, unsure    Home Medications:   * Patient Currently Takes Medications as of 24-Apr-2018 15:25 documented in Structured Notes  · 	Discharge Instructions: Follow up with Dr. Newman as instructed.  	Taked 650mg Tylenol every 6 hours as needed for pain.  · 	doxycycline hyclate 100 mg oral tablet: 1 tab(s) orally 2 times a day   · 	Xanax 0.5 mg oral tablet: 1 tab(s) orally 2 times a day, As Needed  · 	Crestor 5 mg oral tablet: 1 tab(s) orally once a day (at bedtime)  · 	losartan-hydroCHLOROthiazide 100 mg-25 mg oral tablet: 1 tab(s) orally once a day  · 	Lomotil 2.5 mg-0.025 mg oral tablet: 2 tab(s) orally 4 times a day, As Needed  · 	metoprolol succinate 25 mg oral tablet, extended release: 1 tab(s) orally once a day  · 	gabapentin 100 mg oral capsule: 1 cap(s) orally 3 times a day  · 	Dramamine 50 mg oral tablet: 1 tab(s) orally once a day, As Needed  · 	Radha-C 500 mg oral tablet: 1 tab(s) orally once a day  · 	vitamin E 400 intl units oral capsule: 1 cap(s) orally once a day  · 	beta-carotene 25,000 units oral capsule: 1 cap(s) orally once a day  · 	Vitamin D3 1000 intl units oral tablet: 1 tab(s) orally once a day  · 	Zoloft: 150 milligram(s) orally once a day (at bedtime)  · 	aspirin 81 mg oral tablet: 1 tab(s) orally every other day (at bedtime)    Patient History:    Past Medical History:  Age-related macular degeneration  right eye  Appendicitis    History Cardiac arrhythmia    HTN    Hyperlipidemia    Irritable Bowel    Mitral valve prolapse  10-15 years ago  Shoulder sprain  right  Vertigo.     Past Surgical History:  After cataract, bilateral    Cardiac pacemaker  7/9/10  Ovarian Cystectomy    S/P appendectomy    S/P knee replacement  right  2012.    Hospital Course:  5/30/18: Patient presented to [facility] S/P Fall at home. In the emergency room found to have a Right Greater Trochanter Fracture. A bone scan was ordered to evaluate extension of the fracture. After the bone scan the orthopaedic service found fracture not in need of surgical fixation.    5/31/18: Physical Therapy Team evaluated and treated the patient whom recommended Rehab for discharge disposition.   X-ray of right wrist done revealed distal radius fracture. Patient placed in cast.   6/1/18: RUE cast length adjusted. Patient to be discharged to Rehab when bed available.

## 2018-06-01 NOTE — DISCHARGE NOTE ADULT - MEDICATION SUMMARY - MEDICATIONS TO STOP TAKING
I will STOP taking the medications listed below when I get home from the hospital:    aspirin 81 mg oral tablet  -- 1 tab(s) by mouth every other day (at bedtime)    Discharge Instructions  -- Follow up with Dr. Newman as instructed.  Taked 650mg Tylenol every 6 hours as needed for pain.

## 2018-06-01 NOTE — DISCHARGE NOTE ADULT - NS AS ACTIVITY OBS
Walking-Outdoors allowed/Do not make important decisions/Do not drive or operate machinery/Showering allowed/Walking-Indoors allowed/Stairs allowed/No Heavy lifting/straining Showering allowed/No Heavy lifting/straining/Walking-Indoors allowed/Stairs allowed/Keep cast dry./Do not make important decisions/Do not drive or operate machinery/Walking-Outdoors allowed

## 2018-06-01 NOTE — DISCHARGE NOTE ADULT - PATIENT PORTAL LINK FT
You can access the [email or web address] Patient Portal, offered by [facility], by registering with the following website: [email or web address]

## 2018-06-01 NOTE — DISCHARGE NOTE ADULT - CARE PLAN
Principal Discharge DX:	Closed fracture of right hip, initial encounter  Goal:	Painless ambulation. Return to normal activites.  Assessment and plan of treatment:	Follow up with Dr. Cabezas upon discharge from Rehab facility. Call office to schedule appointment.  Activity: Weight Bearing as tolerated on Right and Left Lower Extremity.                NON-weight bearing on Right UPPER Extremity. Principal Discharge DX:	Closed fracture of right hip, initial encounter  Goal:	Painless ambulation. Return to normal activities.  Assessment and plan of treatment:	Follow up with Dr. Cabezas upon discharge from Rehab facility. Call office to schedule appointment.  Activity: Weight Bearing as tolerated on Right and Left Lower Extremity.                NON-weight bearing on Right UPPER Extremity. Principal Discharge DX:	Closed fracture of right hip, initial encounter  Goal:	Painless ambulation. Return to normal activities.  Assessment and plan of treatment:	Follow up with Dr. Cabezas upon discharge from Rehab facility. Call office to schedule appointment.  Activity: Weight Bearing as tolerated on Right and Left Lower Extremity.              NON-weight Bearing on Right UPPER extremity.   DVT prophylaxis: Take Lovenox 40mg SQ daily for 2 WEEKS, followed by Aspirin 325mg Orally Twice Daily for 4 weeks, then resume normal home dose of Aspirin 81mg. Principal Discharge DX:	Closed fracture of right hip, initial encounter  Goal:	Painless ambulation. Return to normal activities.  Assessment and plan of treatment:	Follow up with Dr. Cabezas in TWO WEEKS -->Call office to schedule appointment.  Activity: Weight Bearing as tolerated on Right and Left Lower Extremity.              Right UPPER Extremity: NON-weight Bearing. Keep cast clean and dry. See Dr. Cabezas in 2 WEEKS.   DVT prophylaxis: Take Lovenox 40mg SQ daily for 2 WEEKS, followed by Aspirin 325mg Orally Twice Daily for 4 weeks, then resume normal home dose of Aspirin 81mg.  Laceration Sutures: Remove sutures from laceration above right eyebrow on June 4, 2018 and apply steristrips. Principal Discharge DX:	Closed fracture of right hip, initial encounter  Goal:	Painless ambulation. Return to normal activities.  Assessment and plan of treatment:	Follow up with Dr. Cabezas in TWO WEEKS -->Call office to schedule appointment.  Activity: Weight Bearing as tolerated on Right and Left Lower Extremities.              Right UPPER Extremity: NON-weight Bearing. Keep cast clean and dry. See Dr. Cabezas in 2 WEEKS.   DVT prophylaxis: Take Lovenox 40mg SQ daily for 2 WEEKS, followed by Aspirin 325mg Orally Twice Daily for 4 weeks, then resume normal home dose of Aspirin 81mg.  Laceration Sutures: Remove sutures from laceration above right eyebrow on June 4, 2018 and apply steristrips.

## 2018-06-01 NOTE — OCCUPATIONAL THERAPY INITIAL EVALUATION ADULT - PERTINENT HX OF CURRENT PROBLEM, REHAB EVAL
89 year old female presented to [facility] ED following fall at home. Patient was walking when her slippers became entangled with each other, causing her to trip and fall. She struck her head on the ground but did not lose consciousness. Following the fall, the patient experienced right hip pain and was unable to ambulate. No chest pain/SOB/dizziness/lightheadness prior to fall. Patient had recent PPM generator replacement 4/24/18.

## 2018-06-02 VITALS
TEMPERATURE: 99 F | RESPIRATION RATE: 16 BRPM | DIASTOLIC BLOOD PRESSURE: 75 MMHG | OXYGEN SATURATION: 97 % | SYSTOLIC BLOOD PRESSURE: 147 MMHG | HEART RATE: 61 BPM

## 2018-06-02 PROCEDURE — 97166 OT EVAL MOD COMPLEX 45 MIN: CPT

## 2018-06-02 PROCEDURE — 80048 BASIC METABOLIC PNL TOTAL CA: CPT

## 2018-06-02 PROCEDURE — 73502 X-RAY EXAM HIP UNI 2-3 VIEWS: CPT

## 2018-06-02 PROCEDURE — 76377 3D RENDER W/INTRP POSTPROCES: CPT

## 2018-06-02 PROCEDURE — 73590 X-RAY EXAM OF LOWER LEG: CPT

## 2018-06-02 PROCEDURE — 97161 PT EVAL LOW COMPLEX 20 MIN: CPT

## 2018-06-02 PROCEDURE — 70486 CT MAXILLOFACIAL W/O DYE: CPT

## 2018-06-02 PROCEDURE — 85610 PROTHROMBIN TIME: CPT

## 2018-06-02 PROCEDURE — 72170 X-RAY EXAM OF PELVIS: CPT

## 2018-06-02 PROCEDURE — 96374 THER/PROPH/DIAG INJ IV PUSH: CPT

## 2018-06-02 PROCEDURE — A9561: CPT

## 2018-06-02 PROCEDURE — 85730 THROMBOPLASTIN TIME PARTIAL: CPT

## 2018-06-02 PROCEDURE — 86850 RBC ANTIBODY SCREEN: CPT

## 2018-06-02 PROCEDURE — 82962 GLUCOSE BLOOD TEST: CPT

## 2018-06-02 PROCEDURE — 81001 URINALYSIS AUTO W/SCOPE: CPT

## 2018-06-02 PROCEDURE — 71045 X-RAY EXAM CHEST 1 VIEW: CPT

## 2018-06-02 PROCEDURE — 97116 GAIT TRAINING THERAPY: CPT

## 2018-06-02 PROCEDURE — 90715 TDAP VACCINE 7 YRS/> IM: CPT

## 2018-06-02 PROCEDURE — 86900 BLOOD TYPING SEROLOGIC ABO: CPT

## 2018-06-02 PROCEDURE — 85027 COMPLETE CBC AUTOMATED: CPT

## 2018-06-02 PROCEDURE — 80053 COMPREHEN METABOLIC PANEL: CPT

## 2018-06-02 PROCEDURE — 78999 UNLISTED MISC PX DX NUC MED: CPT

## 2018-06-02 PROCEDURE — 72192 CT PELVIS W/O DYE: CPT

## 2018-06-02 PROCEDURE — 97530 THERAPEUTIC ACTIVITIES: CPT

## 2018-06-02 PROCEDURE — 78315 BONE IMAGING 3 PHASE: CPT

## 2018-06-02 PROCEDURE — 82550 ASSAY OF CK (CPK): CPT

## 2018-06-02 PROCEDURE — 93005 ELECTROCARDIOGRAM TRACING: CPT

## 2018-06-02 PROCEDURE — 86901 BLOOD TYPING SEROLOGIC RH(D): CPT

## 2018-06-02 PROCEDURE — 90471 IMMUNIZATION ADMIN: CPT

## 2018-06-02 PROCEDURE — 72125 CT NECK SPINE W/O DYE: CPT

## 2018-06-02 PROCEDURE — 99285 EMERGENCY DEPT VISIT HI MDM: CPT | Mod: 25

## 2018-06-02 PROCEDURE — 82306 VITAMIN D 25 HYDROXY: CPT

## 2018-06-02 PROCEDURE — 73562 X-RAY EXAM OF KNEE 3: CPT

## 2018-06-02 PROCEDURE — 97110 THERAPEUTIC EXERCISES: CPT

## 2018-06-02 PROCEDURE — 73110 X-RAY EXAM OF WRIST: CPT

## 2018-06-02 PROCEDURE — 70450 CT HEAD/BRAIN W/O DYE: CPT

## 2018-06-02 RX ADMIN — GABAPENTIN 100 MILLIGRAM(S): 400 CAPSULE ORAL at 05:47

## 2018-06-02 RX ADMIN — Medication 25 MILLIGRAM(S): at 05:46

## 2018-06-02 RX ADMIN — LOSARTAN POTASSIUM 100 MILLIGRAM(S): 100 TABLET, FILM COATED ORAL at 05:47

## 2018-06-02 RX ADMIN — HEPARIN SODIUM 5000 UNIT(S): 5000 INJECTION INTRAVENOUS; SUBCUTANEOUS at 05:47

## 2018-06-02 NOTE — PROGRESS NOTE ADULT - PROBLEM SELECTOR PLAN 3
pain meds as needed Physical therapy as tolerated

## 2018-06-02 NOTE — PROGRESS NOTE ADULT - PROBLEM SELECTOR PLAN 1
Patient  non op  Patient weight bearing as tolerated  Pain meds as needed

## 2018-06-02 NOTE — PROGRESS NOTE ADULT - ASSESSMENT
89 yo woman s/op fall at home present with right hip pain. Non operative . Weight bear as tolerated.  Right distal radius fracture casted.  PT as tolerated
89 yo woman s/op fall at home present with right hip pain. Non operative . Weight bear as tolerated.  Right distal radius fracture casted.  PT as tolerated
Imp:  Right great trochanter fx    Plan    D/C facial sutures day #5  ((6/4/18)  F/u xray right hip  Con't PT  D/C planning     Rachel Lerma PA-C   Beeper    [id number]
Impression: Stable       Plan:   Continue present treatment                 Out of bed, ambulate, weight bearing as tolerated                  Physical therapy follow up                  Continue to monitor    Damion Herron PA-C  Orthopaedic Surgery  Team pager [phone]  [phone]
89 yo woman s/op fall at home present with right hip pain

## 2018-06-02 NOTE — PROGRESS NOTE ADULT - SUBJECTIVE AND OBJECTIVE BOX
06-02-18 @ 06:06    Pt comfortable.  Pain well controlled.  No overnight events.      T(C): 36.5 (06-02-18 @ 04:43), Max: 36.9 (06-01-18 @ 22:18)  HR: 62 (06-02-18 @ 04:43) (55 - 70)  BP: 144/62 (06-02-18 @ 04:43) (95/60 - 188/70)  RR: 18 (06-02-18 @ 04:43) (18 - 18)  SpO2: 97% (06-02-18 @ 04:43) (85% - 99%)    RLE N/V Intact.  +DF/PF.  +Distal Pulses.   Motor/Sensory function grossly intact.  Calves soft/NT with venodynes  intact.      Pt w/ Nonop R GT FX, R DR Fx.  -Analgesia  -Cont to Monitor  -DVT Prophylaxis    AYANA JoyC  Orthopaedic Surgery  [id number]
89 year old female presented to [facility] ED following fall at home. Patient was walking when her slippers became entangled with each other, causing her to trip and fall. She struck her head on the ground but did not lose consciousness. Following the fall, the patient experienced right hip pain and was unable to ambulate. No chest pain/SOB/dizziness/lightheadness prior to fall. Patient had recent PPM generator replacement 4/24/18. Patient seen now after bone scan resting comfortable. Right hip fracture determined not to require surgical intervention and patient is awaiting transfer today to a rehabilitation facility.    MEDICATIONS  (STANDING):  atorvastatin 20 milliGRAM(s) Oral at bedtime  docusate sodium 100 milliGRAM(s) Oral three times a day  gabapentin 100 milliGRAM(s) Oral three times a day  heparin  Injectable 5000 Unit(s) SubCutaneous every 8 hours  hydrochlorothiazide 25 milliGRAM(s) Oral daily  losartan 100 milliGRAM(s) Oral daily  metoprolol succinate ER 25 milliGRAM(s) Oral daily  senna 2 Tablet(s) Oral at bedtime  sertraline 150 milliGRAM(s) Oral daily    MEDICATIONS  (PRN):  acetaminophen   Tablet 975 milliGRAM(s) Oral every 8 hours PRN Mild Pain  ALPRAZolam 0.5 milliGRAM(s) Oral two times a day PRN Anxiety  diphenoxylate/atropine 1 Tablet(s) Oral four times a day PRN Diarrhea  diphenoxylate/atropine 1 Tablet(s) Oral every 12 hours PRN Diarrhea  ondansetron    Tablet 4 milliGRAM(s) Oral every 8 hours PRN Nausea and/or Vomiting      Allergies    codeine (Other)  Motrin (Nausea)  Nitroglycerine-passed out (Other)  PCN (Anaphylaxis)  penicillins (Anaphylaxis)    Vital Signs Last 24 Hrs  T(C): 37 (02 Jun 2018 10:13), Max: 37 (02 Jun 2018 10:13)  T(F): 98.6 (02 Jun 2018 10:13), Max: 98.6 (02 Jun 2018 10:13)  HR: 61 (02 Jun 2018 10:13) (55 - 69)  BP: 147/75 (02 Jun 2018 10:13) (95/60 - 147/75)  BP(mean): --  RR: 16 (02 Jun 2018 10:13) (16 - 18)  SpO2: 97% (02 Jun 2018 10:13) (95% - 99%)      PHYSICAL EXAM:  GENERAL: NAD, well nourished and conversant  HEAD:  Atraumatic  EYES: EOM, PERRLA, conjunctiva pink and sclera white  ENT: No tonsillar erythema, exudates, or enlargement, moist mucous membranes, good dentition, no lesions  NECK: Supple, No JVD, normal thyroid, carotids with normal upstrokes and no bruits  CHEST/LUNG: Clear to auscultation bilaterally, No rales, rhonchi, wheezing, or rubs  HEART: Regular rate and rhythm, No murmurs, rubs, or gallops  ABDOMEN: Soft, nondistended, no masses, guarding, tenderness or rebound, bowel sounds present  EXTREMITIES:  2+ Peripheral Pulses, No clubbing, cyanosis, or edema.   Right hip non op.  Right distal radius fracture casted  LYMPH: No lymphadenopathy noted  SKIN: No rashes or lesions  NERVOUS SYSTEM:  Alert & Oriented X3, normal cognitive function. Motor Strength 5/5 right upper and right lower.  5/5 left upper and left lower extremities, DTRs 2+ intact and symmetric    LABS:    No labs obtained today
ORTHO  Patient is a 89y old  Female who presents with a chief complaint of CP (30 May 2018 03:55)    Pt. resting without complaint    VS-  T(C): 36.7 (05-31-18 @ 05:15), Max: 37 (05-30-18 @ 16:08)  HR: 60 (05-31-18 @ 05:15) (60 - 60)  BP: 140/67 (05-31-18 @ 05:15) (103/58 - 158/78)  RR: 16 (05-31-18 @ 05:15) (16 - 20)  SpO2: 97% (05-31-18 @ 05:15) (93% - 97%)  Wt(kg): --    M.S. A&O  Extremity- Right hip min tenderness  Neuro-              Motor- (+)Ankle DF/PF              Sensation- grossly intact to light touch              Calves- soft, nontender- PAS                               11.1   6.5   )-----------( 117      ( 30 May 2018 06:15 )             32.6     05-30    136  |  100  |  46<H>  ----------------------------<  120<H>  4.3   |  23  |  1.26    Ca    9.7      30 May 2018 06:14    TPro  6.9  /  Alb  4.3  /  TBili  0.4  /  DBili  x   /  AST  21  /  ALT  11  /  AlkPhos  87  05-30
Patient is a 89y old  Female who presents with a chief complaint of CP (30 May 2018 03:55)         Patient comfortable at present       VS:  T(C): 36.7 (06-01-18 @ 04:47), Max: 37.2 (05-31-18 @ 15:50)  T(F): 98.1 (06-01-18 @ 04:47), Max: 99 (05-31-18 @ 21:10)  HR: 59 (06-01-18 @ 04:47) (59 - 62)  BP: 135/70 (06-01-18 @ 04:47) (112/70 - 148/68)  RR: 18 (06-01-18 @ 04:47) (18 - 18)  SpO2: 97% (06-01-18 @ 04:47) (95% - 97%)  Wt(kg): --      PHYSICAL EXAM:  NAD, Alert  Laceration above right eye healing with sutures intact  EXT:   Rt Hip:  pain with motion   No Calf Tenderness  (+) DF/PF; (+) Distal Pulses;  Sensation: No gross deficits noted  Pulses 2+   B/L, PAS     LABS:        PT/INR - ( 30 May 2018 06:34 )   PT: 11.7 sec;   INR: 1.08 ratio         PTT - ( 30 May 2018 06:34 )  PTT:30.6 sec
Patient is a 89y old  Female who presents with a chief complaint of Right Hip Pain (01 Jun 2018 11:47)      HPI:   89 year old female presented to [facility] ED following fall at home. Patient was walking when her slippers became entangled with each other, causing her to trip and fall. She struck her head on the ground but did not lose consciousness. Following the fall, the patient experienced right hip pain and was unable to ambulate. No chest pain/SOB/dizziness/lightheadness prior to fall. Patient had recent PPM generator replacement 4/24/18. Patient seen now after bone scan resting comfortably/    MEDICATIONS  (STANDING):  atorvastatin 20 milliGRAM(s) Oral at bedtime  docusate sodium 100 milliGRAM(s) Oral three times a day  gabapentin 100 milliGRAM(s) Oral three times a day  heparin  Injectable 5000 Unit(s) SubCutaneous every 8 hours  hydrochlorothiazide 25 milliGRAM(s) Oral daily  losartan 100 milliGRAM(s) Oral daily  metoprolol succinate ER 25 milliGRAM(s) Oral daily  senna 2 Tablet(s) Oral at bedtime  sertraline 150 milliGRAM(s) Oral daily    MEDICATIONS  (PRN):  acetaminophen   Tablet 975 milliGRAM(s) Oral every 8 hours PRN Mild Pain  ALPRAZolam 0.5 milliGRAM(s) Oral two times a day PRN Anxiety  diphenoxylate/atropine 1 Tablet(s) Oral four times a day PRN Diarrhea  diphenoxylate/atropine 1 Tablet(s) Oral every 12 hours PRN Diarrhea  ondansetron    Tablet 4 milliGRAM(s) Oral every 8 hours PRN Nausea and/or Vomiting      Allergies    codeine (Other)  Motrin (Nausea)  Nitroglycerine-passed out (Other)  PCN (Anaphylaxis)  penicillins (Anaphylaxis)    Intolerances        VITALS:   T(C): 36.7 (06-01-18 @ 16:15), Max: 37.2 (05-31-18 @ 21:10)  HR: 69 (06-01-18 @ 16:15) (59 - 70)  BP: 95/60 (06-01-18 @ 16:15) (95/60 - 188/70)  RR: 18 (06-01-18 @ 16:15) (18 - 18)  SpO2: 99% (06-01-18 @ 16:15) (85% - 99%)  Wt(kg): --    05-31 @ 07:01 - 06-01 @ 07:00  --------------------------------------------------------  IN: 1700 mL / OUT: 1103 mL / NET: 597 mL    06-01 @ 07:01 - 06-01 @ 19:38  --------------------------------------------------------  IN: 480 mL / OUT: 600 mL / NET: -120 mL        PHYSICAL EXAM:  GENERAL: NAD, well nourished and conversant  HEAD:  Atraumatic  EYES: EOM, PERRLA, conjunctiva pink and sclera white  ENT: No tonsillar erythema, exudates, or enlargement, moist mucous membranes, good dentition, no lesions  NECK: Supple, No JVD, normal thyroid, carotids with normal upstrokes and no bruits  CHEST/LUNG: Clear to auscultation bilaterally, No rales, rhonchi, wheezing, or rubs  HEART: Regular rate and rhythm, No murmurs, rubs, or gallops  ABDOMEN: Soft, nondistended, no masses, guarding, tenderness or rebound, bowel sounds present  EXTREMITIES:  2+ Peripheral Pulses, No clubbing, cyanosis, or edema.   Right hip non op.  Right distal radius fracture casted  LYMPH: No lymphadenopathy noted  SKIN: No rashes or lesions  NERVOUS SYSTEM:  Alert & Oriented X3, normal cognitive function. Motor Strength 5/5 right upper and right lower.  5/5 left upper and left lower extremities, DTRs 2+ intact and symmetric    LABS:      05-30    136  |  100  |  46<H>  ----------------------------<  120<H>  4.3   |  23  |  1.26  05-30    138  |  98  |  46<H>  ----------------------------<  113<H>  3.9   |  26  |  1.48<H>    Ca    9.7      30 May 2018 06:14  Ca    9.5      30 May 2018 01:02    TPro  6.9  /  Alb  4.3  /  TBili  0.4  /  DBili  x   /  AST  21  /  ALT  11  /  AlkPhos  87  05-30    CAPILLARY BLOOD GLUCOSE          RADIOLOGY & ADDITIONAL TESTS:      Consultant(s):    Care Discussed with Consultants/Other Providers [ ] YES  [ ] NO
Patient seen and examined. Pain controlled. No acute events overnight    Vital Signs Last 24 Hrs  T(C): 36.7 (05-30-18 @ 05:11), Max: 36.9 (05-29-18 @ 20:57)  T(F): 98.1 (05-30-18 @ 05:11), Max: 98.4 (05-29-18 @ 20:57)  HR: 61 (05-30-18 @ 05:11) (60 - 64)  BP: 165/84 (05-30-18 @ 05:11) (133/74 - 165/84)  BP(mean): --  RR: 18 (05-30-18 @ 05:11) (16 - 18)  SpO2: 99% (05-30-18 @ 05:11) (98% - 99%)    Physical Exam  Gen: NAD  RLE:   Skin c/d/i  +EHL/FHL/Gsc/TA  SILT L3-S1  DP +  Compartments soft  No calf ttp    A/P: 89y Female with right hip fx  plan for OR today with Dr. Cabezas   NPO   IVF while NPO  hold all AC   Pain control  DVT ppx  PT/OT, NWB RLE, bedrest  FU labs  Dispo planning  D/w attending
89 year old female presented to [facility] ED following fall at home. Patient was walking when her slippers became entangled with each other, causing her to trip and fall. She struck her head on the ground but did not lose consciousness. Following the fall, the patient experienced right hip pain and was unable to ambulate. No chest pain/SOB/dizziness/lightheadness prior to fall. Patient had recent PPM generator replacement 18. Patient seen now after bone scan resting comfortably      MEDICATIONS  (STANDING):  atorvastatin 20 milliGRAM(s) Oral at bedtime  docusate sodium 100 milliGRAM(s) Oral three times a day  gabapentin 100 milliGRAM(s) Oral three times a day  heparin  Injectable 5000 Unit(s) SubCutaneous every 8 hours  hydrochlorothiazide 25 milliGRAM(s) Oral daily  losartan 100 milliGRAM(s) Oral daily  metoprolol succinate ER 25 milliGRAM(s) Oral daily  senna 2 Tablet(s) Oral at bedtime  sertraline 150 milliGRAM(s) Oral daily    MEDICATIONS  (PRN):  acetaminophen   Tablet 975 milliGRAM(s) Oral every 8 hours PRN Mild Pain  ALPRAZolam 0.5 milliGRAM(s) Oral two times a day PRN Anxiety  diphenoxylate/atropine 1 Tablet(s) Oral four times a day PRN Diarrhea  diphenoxylate/atropine 1 Tablet(s) Oral every 12 hours PRN Diarrhea  ondansetron    Tablet 4 milliGRAM(s) Oral every 8 hours PRN Nausea and/or Vomiting          VITALS:   T(C): 37.2 (18 @ 15:50), Max: 37.2 (18 @ 15:50)  HR: 60 (18 @ 15:50) (60 - 62)  BP: 148/68 (18 @ 15:50) (103/58 - 148/68)  RR: 18 (18 @ 15:50) (16 - 18)  SpO2: 97% (18 @ 15:50) (95% - 97%)  Wt(kg): --    PHYSICAL EXAM:  GENERAL: NAD, well-groomed, well-developed  HEAD:  Atraumatic, Normocephalic  EYES: EOMI, PERRLA, conjunctiva and sclera clear  ENMT: No tonsillar erythema, exudates, or enlargement; Moist mucous membranes, Good dentition, No lesions  NECK: Supple, No JVD, Normal thyroid  NERVOUS SYSTEM:  Alert & Oriented X3, Good concentration; Motor Strength 5/5 B/L upper and lower extremities; DTRs 2+ intact and symmetric  CHEST/LUNG: Clear to percussion bilaterally; No rales, rhonchi, wheezing, or rubs  HEART: Regular rate and rhythm; No murmurs, rubs, or gallops  ABDOMEN: Soft, Nontender, Nondistended; Bowel sounds present  EXTREMITIES:  2+ Peripheral Pulses, No clubbing, cyanosis, or edema  LYMPH: No lymphadenopathy noted  SKIN: No rashes or lesions    LABS:    CARDIAC MARKERS ( 30 May 2018 01:02 )  x     / x     / 170 U/L / x     / x          CBC Full  -  ( 30 May 2018 06:15 )  WBC Count : 6.5 K/uL  Hemoglobin : 11.1 g/dL  Hematocrit : 32.6 %  Platelet Count - Automated : 117 K/uL  Mean Cell Volume : 87.5 fl  Mean Cell Hemoglobin : 29.7 pg  Mean Cell Hemoglobin Concentration : 33.9 gm/dL  Auto Neutrophil # : x  Auto Lymphocyte # : x  Auto Monocyte # : x  Auto Eosinophil # : x  Auto Basophil # : x  Auto Neutrophil % : x  Auto Lymphocyte % : x  Auto Monocyte % : x  Auto Eosinophil % : x  Auto Basophil % : x    05-30    136  |  100  |  46<H>  ----------------------------<  120<H>  4.3   |  23  |  1.26    Ca    9.7      30 May 2018 06:14    TPro  6.9  /  Alb  4.3  /  TBili  0.4  /  DBili  x   /  AST  21  /  ALT  11  /  AlkPhos  87  05-30    LIVER FUNCTIONS - ( 30 May 2018 01:02 )  Alb: 4.3 g/dL / Pro: 6.9 g/dL / ALK PHOS: 87 U/L / ALT: 11 U/L / AST: 21 U/L / GGT: x           PT/INR - ( 30 May 2018 06:34 )   PT: 11.7 sec;   INR: 1.08 ratio         PTT - ( 30 May 2018 06:34 )  PTT:30.6 sec  Urinalysis Basic - ( 30 May 2018 03:18 )    Color: Yellow / Appearance: Clear / S.020 / pH: x  Gluc: x / Ketone: Negative  / Bili: Negative / Urobili: Negative   Blood: x / Protein: Trace / Nitrite: Negative   Leuk Esterase: Large / RBC: 0-2 /HPF / WBC 26-50 /HPF   Sq Epi: x / Non Sq Epi: OCC /HPF / Bacteria: Few /HPF      CAPILLARY BLOOD GLUCOSE          RADIOLOGY & ADDITIONAL TESTS:

## 2018-06-02 NOTE — PROGRESS NOTE ADULT - PROVIDER SPECIALTY LIST ADULT
Internal Medicine
Internal Medicine
Orthopedics
Internal Medicine

## 2018-06-06 NOTE — OCCUPATIONAL THERAPY INITIAL EVALUATION ADULT - ADAPTIVE EQUIPMENT NEEDED
no direct patient care (not related to procedure)/interpretation of diagnostic studies/consultation with other physicians

## 2018-06-14 ENCOUNTER — APPOINTMENT (OUTPATIENT)
Dept: ORTHOPEDIC SURGERY | Facility: CLINIC | Age: 83
End: 2018-06-14
Payer: MEDICARE

## 2018-06-14 PROCEDURE — 73110 X-RAY EXAM OF WRIST: CPT | Mod: RT

## 2018-06-14 PROCEDURE — 73502 X-RAY EXAM HIP UNI 2-3 VIEWS: CPT | Mod: RT

## 2018-06-14 PROCEDURE — 99203 OFFICE O/P NEW LOW 30 MIN: CPT

## 2018-07-12 ENCOUNTER — APPOINTMENT (OUTPATIENT)
Dept: ORTHOPEDIC SURGERY | Facility: CLINIC | Age: 83
End: 2018-07-12
Payer: MEDICARE

## 2018-07-12 DIAGNOSIS — S72.111D DISPLACED FRACTURE OF GREATER TROCHANTER OF RIGHT FEMUR, SUBSEQUENT ENCOUNTER FOR CLOSED FRACTURE WITH ROUTINE HEALING: ICD-10-CM

## 2018-07-12 DIAGNOSIS — S52.531D COLLES' FRACTURE OF RIGHT RADIUS, SUBSEQUENT ENCOUNTER FOR CLOSED FRACTURE WITH ROUTINE HEALING: ICD-10-CM

## 2018-07-12 PROCEDURE — 99214 OFFICE O/P EST MOD 30 MIN: CPT

## 2018-07-12 PROCEDURE — 73110 X-RAY EXAM OF WRIST: CPT | Mod: RT

## 2018-07-12 PROCEDURE — 73502 X-RAY EXAM HIP UNI 2-3 VIEWS: CPT | Mod: RT

## 2018-09-17 PROBLEM — S52.531D CLOSED COLLES' FRACTURE OF RIGHT RADIUS WITH ROUTINE HEALING, SUBSEQUENT ENCOUNTER: Status: ACTIVE | Noted: 2018-06-14

## 2018-09-17 PROBLEM — S72.111D CLOSED DISPLACED FRACTURE OF GREATER TROCHANTER OF RIGHT FEMUR WITH ROUTINE HEALING, SUBSEQUENT ENCOUNTER: Status: ACTIVE | Noted: 2018-06-14

## 2018-12-12 ENCOUNTER — APPOINTMENT (OUTPATIENT)
Dept: ELECTROPHYSIOLOGY | Facility: CLINIC | Age: 83
End: 2018-12-12

## 2019-02-28 ENCOUNTER — APPOINTMENT (OUTPATIENT)
Dept: ELECTROPHYSIOLOGY | Facility: CLINIC | Age: 84
End: 2019-02-28
Payer: MEDICARE

## 2019-02-28 PROCEDURE — 93294 REM INTERROG EVL PM/LDLS PM: CPT

## 2019-02-28 PROCEDURE — 93296 REM INTERROG EVL PM/IDS: CPT

## 2019-05-31 ENCOUNTER — APPOINTMENT (OUTPATIENT)
Dept: ELECTROPHYSIOLOGY | Facility: CLINIC | Age: 84
End: 2019-05-31
Payer: MEDICARE

## 2019-05-31 PROCEDURE — 93294 REM INTERROG EVL PM/LDLS PM: CPT

## 2019-05-31 PROCEDURE — 93296 REM INTERROG EVL PM/IDS: CPT

## 2019-09-03 ENCOUNTER — APPOINTMENT (OUTPATIENT)
Dept: ELECTROPHYSIOLOGY | Facility: CLINIC | Age: 84
End: 2019-09-03
Payer: MEDICARE

## 2019-09-03 PROCEDURE — 93296 REM INTERROG EVL PM/IDS: CPT

## 2019-09-03 PROCEDURE — 93294 REM INTERROG EVL PM/LDLS PM: CPT

## 2019-12-03 ENCOUNTER — APPOINTMENT (OUTPATIENT)
Dept: ELECTROPHYSIOLOGY | Facility: CLINIC | Age: 84
End: 2019-12-03
Payer: MEDICARE

## 2019-12-03 DIAGNOSIS — I44.2 ATRIOVENTRICULAR BLOCK, COMPLETE: ICD-10-CM

## 2019-12-03 PROCEDURE — 93296 REM INTERROG EVL PM/IDS: CPT

## 2019-12-03 PROCEDURE — 93294 REM INTERROG EVL PM/LDLS PM: CPT

## 2019-12-04 PROBLEM — I44.2 AV BLOCK, 3RD DEGREE: Status: ACTIVE | Noted: 2018-05-10

## 2020-03-05 ENCOUNTER — APPOINTMENT (OUTPATIENT)
Dept: ELECTROPHYSIOLOGY | Facility: CLINIC | Age: 85
End: 2020-03-05
Payer: MEDICARE

## 2020-03-05 PROCEDURE — 93296 REM INTERROG EVL PM/IDS: CPT

## 2020-03-05 PROCEDURE — 93294 REM INTERROG EVL PM/LDLS PM: CPT

## 2020-06-04 ENCOUNTER — APPOINTMENT (OUTPATIENT)
Dept: ELECTROPHYSIOLOGY | Facility: CLINIC | Age: 85
End: 2020-06-04
Payer: MEDICARE

## 2020-06-04 PROCEDURE — 93294 REM INTERROG EVL PM/LDLS PM: CPT

## 2020-06-04 PROCEDURE — 93296 REM INTERROG EVL PM/IDS: CPT

## 2020-08-20 PROBLEM — I49.5 SINUS NODE DYSFUNCTION: Status: ACTIVE | Noted: 2018-04-26

## 2020-09-03 ENCOUNTER — APPOINTMENT (OUTPATIENT)
Dept: ELECTROPHYSIOLOGY | Facility: CLINIC | Age: 85
End: 2020-09-03
Payer: MEDICARE

## 2020-09-03 PROCEDURE — 93296 REM INTERROG EVL PM/IDS: CPT

## 2020-09-03 PROCEDURE — 93294 REM INTERROG EVL PM/LDLS PM: CPT

## 2020-12-03 ENCOUNTER — APPOINTMENT (OUTPATIENT)
Dept: ELECTROPHYSIOLOGY | Facility: CLINIC | Age: 85
End: 2020-12-03
Payer: MEDICARE

## 2020-12-03 PROCEDURE — 93294 REM INTERROG EVL PM/LDLS PM: CPT

## 2020-12-03 PROCEDURE — 93296 REM INTERROG EVL PM/IDS: CPT

## 2021-03-04 ENCOUNTER — NON-APPOINTMENT (OUTPATIENT)
Age: 86
End: 2021-03-04

## 2021-03-04 ENCOUNTER — APPOINTMENT (OUTPATIENT)
Dept: ELECTROPHYSIOLOGY | Facility: CLINIC | Age: 86
End: 2021-03-04
Payer: MEDICARE

## 2021-03-04 PROCEDURE — 93294 REM INTERROG EVL PM/LDLS PM: CPT

## 2021-03-04 PROCEDURE — 93296 REM INTERROG EVL PM/IDS: CPT

## 2021-06-03 ENCOUNTER — APPOINTMENT (OUTPATIENT)
Dept: ELECTROPHYSIOLOGY | Facility: CLINIC | Age: 86
End: 2021-06-03
Payer: MEDICARE

## 2021-06-03 ENCOUNTER — NON-APPOINTMENT (OUTPATIENT)
Age: 86
End: 2021-06-03

## 2021-06-03 PROCEDURE — 93294 REM INTERROG EVL PM/LDLS PM: CPT

## 2021-06-03 PROCEDURE — 93296 REM INTERROG EVL PM/IDS: CPT

## 2021-06-04 ENCOUNTER — INPATIENT (INPATIENT)
Facility: HOSPITAL | Age: 86
LOS: 4 days | Discharge: HOME HEALTH | End: 2021-06-09
Attending: INTERNAL MEDICINE | Admitting: INTERNAL MEDICINE
Payer: MEDICARE

## 2021-06-04 VITALS
SYSTOLIC BLOOD PRESSURE: 170 MMHG | HEART RATE: 59 BPM | HEIGHT: 62 IN | DIASTOLIC BLOOD PRESSURE: 71 MMHG | RESPIRATION RATE: 20 BRPM | OXYGEN SATURATION: 100 % | TEMPERATURE: 98 F

## 2021-06-04 DIAGNOSIS — I10 ESSENTIAL (PRIMARY) HYPERTENSION: ICD-10-CM

## 2021-06-04 DIAGNOSIS — I50.9 HEART FAILURE, UNSPECIFIED: ICD-10-CM

## 2021-06-04 DIAGNOSIS — I34.1 NONRHEUMATIC MITRAL (VALVE) PROLAPSE: ICD-10-CM

## 2021-06-04 LAB
ALBUMIN SERPL ELPH-MCNC: 3.9 G/DL — SIGNIFICANT CHANGE UP (ref 3.3–5)
ALP SERPL-CCNC: 90 U/L — SIGNIFICANT CHANGE UP (ref 40–120)
ALT FLD-CCNC: 32 U/L — SIGNIFICANT CHANGE UP (ref 4–33)
ANION GAP SERPL CALC-SCNC: 12 MMOL/L — SIGNIFICANT CHANGE UP (ref 7–14)
AST SERPL-CCNC: 32 U/L — SIGNIFICANT CHANGE UP (ref 4–32)
BILIRUB SERPL-MCNC: 0.6 MG/DL — SIGNIFICANT CHANGE UP (ref 0.2–1.2)
BUN SERPL-MCNC: 29 MG/DL — HIGH (ref 7–23)
CALCIUM SERPL-MCNC: 8.8 MG/DL — SIGNIFICANT CHANGE UP (ref 8.4–10.5)
CHLORIDE SERPL-SCNC: 101 MMOL/L — SIGNIFICANT CHANGE UP (ref 98–107)
CO2 SERPL-SCNC: 22 MMOL/L — SIGNIFICANT CHANGE UP (ref 22–31)
CREAT SERPL-MCNC: 1.32 MG/DL — HIGH (ref 0.5–1.3)
GLUCOSE SERPL-MCNC: 159 MG/DL — HIGH (ref 70–99)
HCT VFR BLD CALC: 30 % — LOW (ref 34.5–45)
HGB BLD-MCNC: 9.4 G/DL — LOW (ref 11.5–15.5)
MAGNESIUM SERPL-MCNC: 1.8 MG/DL — SIGNIFICANT CHANGE UP (ref 1.6–2.6)
MCHC RBC-ENTMCNC: 28 PG — SIGNIFICANT CHANGE UP (ref 27–34)
MCHC RBC-ENTMCNC: 31.3 GM/DL — LOW (ref 32–36)
MCV RBC AUTO: 89.3 FL — SIGNIFICANT CHANGE UP (ref 80–100)
NRBC # BLD: 0 /100 WBCS — SIGNIFICANT CHANGE UP
NRBC # FLD: 0 K/UL — SIGNIFICANT CHANGE UP
NT-PROBNP SERPL-SCNC: HIGH PG/ML
PHOSPHATE SERPL-MCNC: 3.3 MG/DL — SIGNIFICANT CHANGE UP (ref 2.5–4.5)
PLATELET # BLD AUTO: 125 K/UL — LOW (ref 150–400)
POTASSIUM SERPL-MCNC: 4 MMOL/L — SIGNIFICANT CHANGE UP (ref 3.5–5.3)
POTASSIUM SERPL-SCNC: 4 MMOL/L — SIGNIFICANT CHANGE UP (ref 3.5–5.3)
PROT SERPL-MCNC: 6.4 G/DL — SIGNIFICANT CHANGE UP (ref 6–8.3)
RBC # BLD: 3.36 M/UL — LOW (ref 3.8–5.2)
RBC # FLD: 14.9 % — HIGH (ref 10.3–14.5)
SARS-COV-2 RNA SPEC QL NAA+PROBE: SIGNIFICANT CHANGE UP
SODIUM SERPL-SCNC: 135 MMOL/L — SIGNIFICANT CHANGE UP (ref 135–145)
TROPONIN T, HIGH SENSITIVITY RESULT: 56 NG/L — CRITICAL HIGH
TROPONIN T, HIGH SENSITIVITY RESULT: 58 NG/L — CRITICAL HIGH
WBC # BLD: 4.14 K/UL — SIGNIFICANT CHANGE UP (ref 3.8–10.5)
WBC # FLD AUTO: 4.14 K/UL — SIGNIFICANT CHANGE UP (ref 3.8–10.5)

## 2021-06-04 PROCEDURE — 93010 ELECTROCARDIOGRAM REPORT: CPT

## 2021-06-04 PROCEDURE — 71046 X-RAY EXAM CHEST 2 VIEWS: CPT | Mod: 26

## 2021-06-04 PROCEDURE — 99285 EMERGENCY DEPT VISIT HI MDM: CPT | Mod: 25,GC

## 2021-06-04 RX ORDER — FUROSEMIDE 40 MG
40 TABLET ORAL ONCE
Refills: 0 | Status: COMPLETED | OUTPATIENT
Start: 2021-06-04 | End: 2021-06-04

## 2021-06-04 RX ORDER — SERTRALINE 25 MG/1
150 TABLET, FILM COATED ORAL AT BEDTIME
Refills: 0 | Status: DISCONTINUED | OUTPATIENT
Start: 2021-06-04 | End: 2021-06-04

## 2021-06-04 RX ORDER — ASPIRIN/CALCIUM CARB/MAGNESIUM 324 MG
1 TABLET ORAL
Qty: 0 | Refills: 0 | DISCHARGE

## 2021-06-04 RX ORDER — GABAPENTIN 400 MG/1
100 CAPSULE ORAL THREE TIMES A DAY
Refills: 0 | Status: DISCONTINUED | OUTPATIENT
Start: 2021-06-04 | End: 2021-06-09

## 2021-06-04 RX ORDER — ENOXAPARIN SODIUM 100 MG/ML
30 INJECTION SUBCUTANEOUS DAILY
Refills: 0 | Status: DISCONTINUED | OUTPATIENT
Start: 2021-06-04 | End: 2021-06-09

## 2021-06-04 RX ORDER — ALPRAZOLAM 0.25 MG
0.5 TABLET ORAL AT BEDTIME
Refills: 0 | Status: DISCONTINUED | OUTPATIENT
Start: 2021-06-04 | End: 2021-06-09

## 2021-06-04 RX ORDER — LOSARTAN/HYDROCHLOROTHIAZIDE 100MG-25MG
1 TABLET ORAL
Qty: 0 | Refills: 0 | DISCHARGE

## 2021-06-04 RX ORDER — ACETAMINOPHEN 500 MG
650 TABLET ORAL EVERY 6 HOURS
Refills: 0 | Status: DISCONTINUED | OUTPATIENT
Start: 2021-06-04 | End: 2021-06-09

## 2021-06-04 RX ORDER — SERTRALINE 25 MG/1
150 TABLET, FILM COATED ORAL AT BEDTIME
Refills: 0 | Status: DISCONTINUED | OUTPATIENT
Start: 2021-06-04 | End: 2021-06-09

## 2021-06-04 RX ORDER — HYDRALAZINE HCL 50 MG
25 TABLET ORAL
Refills: 0 | Status: DISCONTINUED | OUTPATIENT
Start: 2021-06-04 | End: 2021-06-09

## 2021-06-04 RX ORDER — METOPROLOL TARTRATE 50 MG
25 TABLET ORAL DAILY
Refills: 0 | Status: DISCONTINUED | OUTPATIENT
Start: 2021-06-04 | End: 2021-06-09

## 2021-06-04 RX ORDER — FUROSEMIDE 40 MG
40 TABLET ORAL DAILY
Refills: 0 | Status: DISCONTINUED | OUTPATIENT
Start: 2021-06-04 | End: 2021-06-06

## 2021-06-04 RX ORDER — CHOLECALCIFEROL (VITAMIN D3) 125 MCG
1000 CAPSULE ORAL DAILY
Refills: 0 | Status: DISCONTINUED | OUTPATIENT
Start: 2021-06-04 | End: 2021-06-09

## 2021-06-04 RX ORDER — ATORVASTATIN CALCIUM 80 MG/1
20 TABLET, FILM COATED ORAL AT BEDTIME
Refills: 0 | Status: DISCONTINUED | OUTPATIENT
Start: 2021-06-04 | End: 2021-06-09

## 2021-06-04 RX ORDER — BETA-CAROTENE 7500 MCG
1 CAPSULE ORAL
Qty: 0 | Refills: 0 | DISCHARGE

## 2021-06-04 RX ORDER — ASCORBIC ACID 60 MG
500 TABLET,CHEWABLE ORAL DAILY
Refills: 0 | Status: DISCONTINUED | OUTPATIENT
Start: 2021-06-04 | End: 2021-06-09

## 2021-06-04 RX ORDER — LOSARTAN POTASSIUM 100 MG/1
100 TABLET, FILM COATED ORAL DAILY
Refills: 0 | Status: DISCONTINUED | OUTPATIENT
Start: 2021-06-04 | End: 2021-06-08

## 2021-06-04 RX ORDER — VITAMIN E 100 UNIT
400 CAPSULE ORAL DAILY
Refills: 0 | Status: DISCONTINUED | OUTPATIENT
Start: 2021-06-04 | End: 2021-06-09

## 2021-06-04 RX ADMIN — Medication 40 MILLIGRAM(S): at 15:57

## 2021-06-04 RX ADMIN — Medication 25 MILLIGRAM(S): at 19:31

## 2021-06-04 RX ADMIN — LOSARTAN POTASSIUM 100 MILLIGRAM(S): 100 TABLET, FILM COATED ORAL at 19:31

## 2021-06-04 NOTE — ED PROVIDER NOTE - CLINICAL SUMMARY MEDICAL DECISION MAKING FREE TEXT BOX
Jade: Sent from her [facility] Dr. Portillo malcolm and Dr. Damion Lopez for CHF admission. (B) ankle swelling. SOB. No F/CP. PE: rales, (B) ankle swelling. Check CXR, EKG, BNP, trop, CXR, EKG. If K <3.5, give Lasix. Jade: Sent from her [facility] Dr. Portillo malcolm and Dr. Damion Lopez for CHF admission. (B) ankle swelling. SOB. No F/CP. PE: rales, (B) ankle swelling. Check CXR, EKG, BNP, trop, CXR, EKG. If K <3.5, give Lasix. Admit to Dr. Jurado.

## 2021-06-04 NOTE — ED ADULT TRIAGE NOTE - CHIEF COMPLAINT QUOTE
c/o SOB x 2 weeks, BLE pitting edema x 1 week. +2 ptting pedal/ankle edema noted. c/o SOB x 2 weeks, BLE pitting edema x 1 week. +2 pitting pedal/ankle edema noted. Hx PPM

## 2021-06-04 NOTE — ED PROVIDER NOTE - OBJECTIVE STATEMENT
89F hx of hx of HTN, HLD, Mitral valve prolapse, with pacemaker ([employer], last changed 2018), presents to the ED after discussion with Dr. Roman and Dr. Lopez for evaluation for new heart failure (saw Dr. Roman in office likely this past Monday for sob). Patient still making urine. + shortness of breath. Denies chest pain, cough, fever, chills, nausea, vomiting, abdominal pain. Patient can only walk 2 blocks with walker before having to sit.

## 2021-06-04 NOTE — ED PROVIDER NOTE - ATTENDING CONTRIBUTION TO CARE
I performed a face-to-face evaluation of the patient and performed a history and physical examination. I agree with the history and physical examination.    Sent from her [facility] Dr. Portillo malcolm and Dr. Damion Lopez for CHF admission. (B) ankle swelling. SOB. No F/CP. PE: rales, (B) ankle swelling. Check CXR, EKG, BNP, trop, CXR, EKG. If K <3.5, give Lasix. I performed a face-to-face evaluation of the patient and performed a history and physical examination. I agree with the history and physical examination.    Sent from her [facility] Dr. Portillo malcolm and Dr. Damion Lopez for CHF admission. (B) ankle swelling. SOB. No F/CP. PE: rales, (B) ankle swelling. Check CXR, EKG, BNP, trop, CXR, EKG. If K <3.5, give Lasix. Admit to Dr. Jurado.

## 2021-06-04 NOTE — H&P ADULT - HISTORY OF PRESENT ILLNESS
· HPI Objective Statement: 89F hx of hx of HTN, HLD, Mitral valve prolapse, PSVT , back pain  with pacemaker ([employer], last changed 2018), presents to the ED after discussion with Dr. Roman and Dr. Lopez for evaluation for new heart failure (saw Dr. Roman in office likely this past Wednesdsay  for sob). Patient with shortness of breath on minnimal exertion.   . Denies chest pain, cough, fever, chills, nausea, vomiting, abdominal pain. Patient can only walk several steps before having to sit.  · Chief Complaint: The patient is a 89y Female complaining of shortness of breath.

## 2021-06-04 NOTE — ED ADULT NURSE NOTE - NSIMPLEMENTINTERV_GEN_ALL_ED
Implemented All Universal Safety Interventions:  [place] to call system. Call bell, personal items and telephone within reach. Instruct patient to call for assistance. Room bathroom lighting operational. Non-slip footwear when patient is off stretcher. Physically safe environment: no spills, clutter or unnecessary equipment. Stretcher in lowest position, wheels locked, appropriate side rails in place.

## 2021-06-04 NOTE — ED PROVIDER NOTE - NS ED ROS FT
CONSTITUTIONAL: No fevers, chills, fatigue, diaphoresis  EYES: No double vision  ENT: No nasal congestion, runny nose, sore throat, jaw pain  CV: No chest pain, palpitations, paroxysmal nocturnal dyspnea, orthopnea  PULM: + shortness of breath. No cough  GI: No abdominal pain, nausea, vomiting, bloody stools  SKIN: + leg swelling. No rashes, arm swelling  MSK: No muscle aches, back pain  NEURO: No syncope  PSYCH: No anxiety

## 2021-06-04 NOTE — ED ADULT NURSE NOTE - OBJECTIVE STATEMENT
Pt presents to rm 7, A&Ox2-3- confuses the year, hard of hearing, ambulatory w/ walker at baseline, pmhx of HTN, here for evaluation of SOB and B/L lower extremity swelling x1-2wks. Denies chest pain, palpitations, diaphoresis, headaches, fevers, dizziness, nausea, vomiting, diarrhea, or urinary symptoms at this time. IV established in left arm with a 18G, labs drawn and sent, call bell in reach, warm blanket provided, bed in lowest position, side rails up x2, MD evaluation in progress, pt on telemetry. Will continue to monitor.

## 2021-06-04 NOTE — ED PROVIDER NOTE - PHYSICAL EXAMINATION
GENERAL: elderly female, lying in bed, NAD. Hypertensive otherwise Vital signs are within normal limits  EYES: Conjunctiva noninjected or pale, sclera anicteric  HENT: NC/AT, moist mucous membranes  NECK: Supple, trachea midline  LUNG: Nonlabored respirations, no wheezes,  +rales at bases  CV: RRR, +systolic murmur, Pulses- Radial/dp: 2+ b/l  ABDOMEN: Nondistended, nontender, no rebound  MSK: No visible deformities, nontender extremities. + LE pitting edema 2+ pretibially  SKIN: No rashes, bruises. + warm  NEURO: AAOx4 (to person, place, time, event), no tremor  PSYCH: Normal mood and affect

## 2021-06-04 NOTE — H&P ADULT - NSICDXPASTSURGICALHX_GEN_ALL_CORE_FT
PAST SURGICAL HISTORY:  After cataract, bilateral     Cardiac pacemaker 7/9/10    Ovarian Cystectomy     S/P appendectomy     S/P knee replacement right  2012

## 2021-06-04 NOTE — ED PROVIDER NOTE - PROGRESS NOTE DETAILS
Julio Cowart D.O., PGY2 (Resident)  Per Dr. Roman, rafael neg in office, bnp 11k. Endorsed bnp today 12.5K and PE concerning for fluid overload. Admit to his service. Lasix 40mg ordered.

## 2021-06-04 NOTE — H&P ADULT - NSICDXPASTMEDICALHX_GEN_ALL_CORE_FT
PAST MEDICAL HISTORY:  Age-related macular degeneration right eye    Appendicitis     History Cardiac arrhythmia     HTN     Hyperlipidemia     Irritable Bowel     Mitral valve prolapse 10-15 years ago    Shoulder sprain right    Vertigo

## 2021-06-04 NOTE — PATIENT PROFILE ADULT - NSPROGENSOURCEINFO_GEN_A_NUR
RECORDS RECEIVED FROM: Internal    DATE RECEIVED: 7/29/20    NOTES STATUS DETAILS   OFFICE NOTE from referring provider Internal OV note 5/22/20    MEDICATION LIST Internal         ENDOSCOPY  Internal 8/26/16   COLONOSCOPY Internal 2/7/19   PERTINENT LABS Internal    IMAGING (CT, MRI, EGD) Internal XR ABD 5/22/20, 8/4/19  CT AND 8/2/19     REFERRAL INFORMATION    Date referral was placed: 7/29/20   Date all records received:    Date records were scanned into EPIC:    Date records were sent to Provider to review:    Date and recommendation received from provider:  LETTER SENT  SCHEDULE APPOINTMENT   Date patient was contacted to schedule: 7/15/20           patient

## 2021-06-04 NOTE — H&P ADULT - NSICDXFAMILYHX_GEN_ALL_CORE_FT
FAMILY HISTORY:  Family history of stomach cancer, father    Sibling  Still living? Unknown  Family history of non-Hodgkin's lymphoma, Age at diagnosis: Age Unknown  Family history of prostate cancer, Age at diagnosis: Age Unknown    Child  Still living? Yes, Estimated age: Age Unknown  Family history of breast cancer, Age at diagnosis: Age Unknown  Family history of malignant melanoma of skin, Age at diagnosis: Age Unknown

## 2021-06-04 NOTE — H&P ADULT - NSHPLABSRESULTS_GEN_ALL_CORE
9.4    4.14  )-----------( 125      ( 04 Jun 2021 13:17 )             30.0     04 Jun 2021 13:17    135    |  101    |  29     ----------------------------<  159    4.0     |  22     |  1.32     Ca    8.8        04 Jun 2021 13:17  Phos  3.3       04 Jun 2021 13:17  Mg     1.8       04 Jun 2021 13:17    TPro  6.4    /  Alb  3.9    /  TBili  0.6    /  DBili  x      /  AST  32     /  ALT  32     /  AlkPhos  90     04 Jun 2021 13:17    cally Pro-Brain Natriuretic Peptide: 47099: Acute congestive heart failure is unlikely if NT-proBNP is < 300 pg/mL.     CXR - posterior effusion     Telem AV paced

## 2021-06-04 NOTE — H&P ADULT - NSHPPHYSICALEXAM_GEN_ALL_CORE
Vital Signs Last 24 Hrs  T(C): 36.9 (04 Jun 2021 15:57), Max: 36.9 (04 Jun 2021 15:57)  T(F): 98.5 (04 Jun 2021 15:57), Max: 98.5 (04 Jun 2021 15:57)  HR: 60 (04 Jun 2021 15:57) (59 - 60)  BP: 196/78 (04 Jun 2021 15:57) (170/71 - 196/78)  BP(mean): --  RR: 18 (04 Jun 2021 15:57) (18 - 20)  SpO2: 100% (04 Jun 2021 15:57) (100% - 100%)    Physical Examination: GENERAL: elderly female, lying in bed, NAD. Hypertensive otherwise Vital signs are within normal limits  	EYES: Conjunctiva noninjected or pale, sclera anicteric  	HENT: NC/AT, moist mucous membranes  	NECK: Supple, trachea midline  No JVD at 60 Degrees   	LUNG: Nonlabored respirations, no wheezes,  +rales at bases  Decreased breath sounds left base   	CV: RRR, +  III/VI systolic murmur,   	ABDOMEN: Nondistended, nontender, no rebound  	MSK: No visible deformities, nontender extremities. + LE pitting edema trace pretibially

## 2021-06-05 LAB
ANION GAP SERPL CALC-SCNC: 17 MMOL/L — HIGH (ref 7–14)
BUN SERPL-MCNC: 24 MG/DL — HIGH (ref 7–23)
CALCIUM SERPL-MCNC: 9.4 MG/DL — SIGNIFICANT CHANGE UP (ref 8.4–10.5)
CHLORIDE SERPL-SCNC: 97 MMOL/L — LOW (ref 98–107)
CO2 SERPL-SCNC: 22 MMOL/L — SIGNIFICANT CHANGE UP (ref 22–31)
COVID-19 SPIKE DOMAIN AB INTERP: POSITIVE
COVID-19 SPIKE DOMAIN ANTIBODY RESULT: >250 U/ML — HIGH
CREAT SERPL-MCNC: 1.36 MG/DL — HIGH (ref 0.5–1.3)
GLUCOSE SERPL-MCNC: 154 MG/DL — HIGH (ref 70–99)
HCT VFR BLD CALC: 33.8 % — LOW (ref 34.5–45)
HGB BLD-MCNC: 10.9 G/DL — LOW (ref 11.5–15.5)
MAGNESIUM SERPL-MCNC: 1.8 MG/DL — SIGNIFICANT CHANGE UP (ref 1.6–2.6)
MCHC RBC-ENTMCNC: 27.6 PG — SIGNIFICANT CHANGE UP (ref 27–34)
MCHC RBC-ENTMCNC: 32.2 GM/DL — SIGNIFICANT CHANGE UP (ref 32–36)
MCV RBC AUTO: 85.6 FL — SIGNIFICANT CHANGE UP (ref 80–100)
NRBC # BLD: 0 /100 WBCS — SIGNIFICANT CHANGE UP
NRBC # FLD: 0 K/UL — SIGNIFICANT CHANGE UP
PHOSPHATE SERPL-MCNC: 3.9 MG/DL — SIGNIFICANT CHANGE UP (ref 2.5–4.5)
PLATELET # BLD AUTO: 170 K/UL — SIGNIFICANT CHANGE UP (ref 150–400)
POTASSIUM SERPL-MCNC: 3.7 MMOL/L — SIGNIFICANT CHANGE UP (ref 3.5–5.3)
POTASSIUM SERPL-SCNC: 3.7 MMOL/L — SIGNIFICANT CHANGE UP (ref 3.5–5.3)
RBC # BLD: 3.95 M/UL — SIGNIFICANT CHANGE UP (ref 3.8–5.2)
RBC # FLD: 14.9 % — HIGH (ref 10.3–14.5)
SARS-COV-2 IGG+IGM SERPL QL IA: >250 U/ML — HIGH
SARS-COV-2 IGG+IGM SERPL QL IA: POSITIVE
SODIUM SERPL-SCNC: 136 MMOL/L — SIGNIFICANT CHANGE UP (ref 135–145)
WBC # BLD: 7.32 K/UL — SIGNIFICANT CHANGE UP (ref 3.8–10.5)
WBC # FLD AUTO: 7.32 K/UL — SIGNIFICANT CHANGE UP (ref 3.8–10.5)

## 2021-06-05 RX ORDER — PREDNISOLONE SODIUM PHOSPHATE 1 %
1 DROPS OPHTHALMIC (EYE) DAILY
Refills: 0 | Status: DISCONTINUED | OUTPATIENT
Start: 2021-06-05 | End: 2021-06-09

## 2021-06-05 RX ORDER — MAGNESIUM OXIDE 400 MG ORAL TABLET 241.3 MG
400 TABLET ORAL
Refills: 0 | Status: COMPLETED | OUTPATIENT
Start: 2021-06-05 | End: 2021-06-06

## 2021-06-05 RX ORDER — POTASSIUM CHLORIDE 20 MEQ
20 PACKET (EA) ORAL ONCE
Refills: 0 | Status: COMPLETED | OUTPATIENT
Start: 2021-06-05 | End: 2021-06-05

## 2021-06-05 RX ORDER — LATANOPROST 0.05 MG/ML
1 SOLUTION/ DROPS OPHTHALMIC; TOPICAL AT BEDTIME
Refills: 0 | Status: DISCONTINUED | OUTPATIENT
Start: 2021-06-05 | End: 2021-06-09

## 2021-06-05 RX ORDER — DIPHENOXYLATE HCL/ATROPINE 2.5-.025MG
1 TABLET ORAL EVERY 6 HOURS
Refills: 0 | Status: DISCONTINUED | OUTPATIENT
Start: 2021-06-05 | End: 2021-06-09

## 2021-06-05 RX ORDER — ATROPINE SULFATE 1 %
1 DROPS OPHTHALMIC (EYE)
Refills: 0 | Status: DISCONTINUED | OUTPATIENT
Start: 2021-06-05 | End: 2021-06-09

## 2021-06-05 RX ORDER — DORZOLAMIDE HYDROCHLORIDE TIMOLOL MALEATE 20; 5 MG/ML; MG/ML
1 SOLUTION/ DROPS OPHTHALMIC
Refills: 0 | Status: DISCONTINUED | OUTPATIENT
Start: 2021-06-05 | End: 2021-06-09

## 2021-06-05 RX ADMIN — Medication 500 MILLIGRAM(S): at 12:26

## 2021-06-05 RX ADMIN — SERTRALINE 150 MILLIGRAM(S): 25 TABLET, FILM COATED ORAL at 00:05

## 2021-06-05 RX ADMIN — Medication 20 MILLIEQUIVALENT(S): at 16:14

## 2021-06-05 RX ADMIN — GABAPENTIN 100 MILLIGRAM(S): 400 CAPSULE ORAL at 05:30

## 2021-06-05 RX ADMIN — Medication 1000 UNIT(S): at 12:25

## 2021-06-05 RX ADMIN — Medication 40 MILLIGRAM(S): at 05:29

## 2021-06-05 RX ADMIN — Medication 25 MILLIGRAM(S): at 16:14

## 2021-06-05 RX ADMIN — SERTRALINE 150 MILLIGRAM(S): 25 TABLET, FILM COATED ORAL at 22:04

## 2021-06-05 RX ADMIN — GABAPENTIN 100 MILLIGRAM(S): 400 CAPSULE ORAL at 22:04

## 2021-06-05 RX ADMIN — LATANOPROST 1 DROP(S): 0.05 SOLUTION/ DROPS OPHTHALMIC; TOPICAL at 22:55

## 2021-06-05 RX ADMIN — ATORVASTATIN CALCIUM 20 MILLIGRAM(S): 80 TABLET, FILM COATED ORAL at 00:04

## 2021-06-05 RX ADMIN — DORZOLAMIDE HYDROCHLORIDE TIMOLOL MALEATE 1 DROP(S): 20; 5 SOLUTION/ DROPS OPHTHALMIC at 16:15

## 2021-06-05 RX ADMIN — Medication 25 MILLIGRAM(S): at 05:30

## 2021-06-05 RX ADMIN — Medication 400 INTERNATIONAL UNIT(S): at 12:30

## 2021-06-05 RX ADMIN — LOSARTAN POTASSIUM 100 MILLIGRAM(S): 100 TABLET, FILM COATED ORAL at 05:30

## 2021-06-05 RX ADMIN — GABAPENTIN 100 MILLIGRAM(S): 400 CAPSULE ORAL at 12:26

## 2021-06-05 RX ADMIN — ATORVASTATIN CALCIUM 20 MILLIGRAM(S): 80 TABLET, FILM COATED ORAL at 22:04

## 2021-06-05 RX ADMIN — ENOXAPARIN SODIUM 30 MILLIGRAM(S): 100 INJECTION SUBCUTANEOUS at 12:26

## 2021-06-05 RX ADMIN — Medication 1 DROP(S): at 16:14

## 2021-06-05 RX ADMIN — Medication 25 MILLIGRAM(S): at 05:29

## 2021-06-05 RX ADMIN — Medication 1 DROP(S): at 16:15

## 2021-06-05 RX ADMIN — MAGNESIUM OXIDE 400 MG ORAL TABLET 400 MILLIGRAM(S): 241.3 TABLET ORAL at 16:14

## 2021-06-05 RX ADMIN — GABAPENTIN 100 MILLIGRAM(S): 400 CAPSULE ORAL at 00:05

## 2021-06-06 LAB
ANION GAP SERPL CALC-SCNC: 14 MMOL/L — SIGNIFICANT CHANGE UP (ref 7–14)
BUN SERPL-MCNC: 30 MG/DL — HIGH (ref 7–23)
CALCIUM SERPL-MCNC: 8.8 MG/DL — SIGNIFICANT CHANGE UP (ref 8.4–10.5)
CHLORIDE SERPL-SCNC: 98 MMOL/L — SIGNIFICANT CHANGE UP (ref 98–107)
CO2 SERPL-SCNC: 24 MMOL/L — SIGNIFICANT CHANGE UP (ref 22–31)
CREAT SERPL-MCNC: 1.47 MG/DL — HIGH (ref 0.5–1.3)
GLUCOSE SERPL-MCNC: 137 MG/DL — HIGH (ref 70–99)
HCT VFR BLD CALC: 31.9 % — LOW (ref 34.5–45)
HGB BLD-MCNC: 10.3 G/DL — LOW (ref 11.5–15.5)
MAGNESIUM SERPL-MCNC: 1.8 MG/DL — SIGNIFICANT CHANGE UP (ref 1.6–2.6)
MCHC RBC-ENTMCNC: 28.2 PG — SIGNIFICANT CHANGE UP (ref 27–34)
MCHC RBC-ENTMCNC: 32.3 GM/DL — SIGNIFICANT CHANGE UP (ref 32–36)
MCV RBC AUTO: 87.4 FL — SIGNIFICANT CHANGE UP (ref 80–100)
NRBC # BLD: 0 /100 WBCS — SIGNIFICANT CHANGE UP
NRBC # FLD: 0 K/UL — SIGNIFICANT CHANGE UP
PHOSPHATE SERPL-MCNC: 3.5 MG/DL — SIGNIFICANT CHANGE UP (ref 2.5–4.5)
PLATELET # BLD AUTO: 137 K/UL — LOW (ref 150–400)
POTASSIUM SERPL-MCNC: 3.3 MMOL/L — LOW (ref 3.5–5.3)
POTASSIUM SERPL-SCNC: 3.3 MMOL/L — LOW (ref 3.5–5.3)
RBC # BLD: 3.65 M/UL — LOW (ref 3.8–5.2)
RBC # FLD: 15.2 % — HIGH (ref 10.3–14.5)
SODIUM SERPL-SCNC: 136 MMOL/L — SIGNIFICANT CHANGE UP (ref 135–145)
WBC # BLD: 5.08 K/UL — SIGNIFICANT CHANGE UP (ref 3.8–10.5)
WBC # FLD AUTO: 5.08 K/UL — SIGNIFICANT CHANGE UP (ref 3.8–10.5)

## 2021-06-06 RX ORDER — FUROSEMIDE 40 MG
40 TABLET ORAL DAILY
Refills: 0 | Status: DISCONTINUED | OUTPATIENT
Start: 2021-06-07 | End: 2021-06-09

## 2021-06-06 RX ORDER — POTASSIUM CHLORIDE 20 MEQ
40 PACKET (EA) ORAL EVERY 4 HOURS
Refills: 0 | Status: COMPLETED | OUTPATIENT
Start: 2021-06-06 | End: 2021-06-06

## 2021-06-06 RX ADMIN — Medication 1 DROP(S): at 11:11

## 2021-06-06 RX ADMIN — Medication 40 MILLIEQUIVALENT(S): at 15:30

## 2021-06-06 RX ADMIN — Medication 25 MILLIGRAM(S): at 05:54

## 2021-06-06 RX ADMIN — SERTRALINE 150 MILLIGRAM(S): 25 TABLET, FILM COATED ORAL at 21:43

## 2021-06-06 RX ADMIN — Medication 40 MILLIGRAM(S): at 05:54

## 2021-06-06 RX ADMIN — LATANOPROST 1 DROP(S): 0.05 SOLUTION/ DROPS OPHTHALMIC; TOPICAL at 23:12

## 2021-06-06 RX ADMIN — ATORVASTATIN CALCIUM 20 MILLIGRAM(S): 80 TABLET, FILM COATED ORAL at 21:43

## 2021-06-06 RX ADMIN — MAGNESIUM OXIDE 400 MG ORAL TABLET 400 MILLIGRAM(S): 241.3 TABLET ORAL at 11:54

## 2021-06-06 RX ADMIN — DORZOLAMIDE HYDROCHLORIDE TIMOLOL MALEATE 1 DROP(S): 20; 5 SOLUTION/ DROPS OPHTHALMIC at 05:54

## 2021-06-06 RX ADMIN — GABAPENTIN 100 MILLIGRAM(S): 400 CAPSULE ORAL at 14:21

## 2021-06-06 RX ADMIN — GABAPENTIN 100 MILLIGRAM(S): 400 CAPSULE ORAL at 21:43

## 2021-06-06 RX ADMIN — Medication 25 MILLIGRAM(S): at 17:25

## 2021-06-06 RX ADMIN — LOSARTAN POTASSIUM 100 MILLIGRAM(S): 100 TABLET, FILM COATED ORAL at 05:54

## 2021-06-06 RX ADMIN — ENOXAPARIN SODIUM 30 MILLIGRAM(S): 100 INJECTION SUBCUTANEOUS at 11:10

## 2021-06-06 RX ADMIN — Medication 1 TABLET(S): at 14:20

## 2021-06-06 RX ADMIN — GABAPENTIN 100 MILLIGRAM(S): 400 CAPSULE ORAL at 05:54

## 2021-06-06 RX ADMIN — Medication 1 DROP(S): at 17:25

## 2021-06-06 RX ADMIN — MAGNESIUM OXIDE 400 MG ORAL TABLET 400 MILLIGRAM(S): 241.3 TABLET ORAL at 08:48

## 2021-06-06 RX ADMIN — Medication 40 MILLIEQUIVALENT(S): at 11:26

## 2021-06-06 RX ADMIN — Medication 400 INTERNATIONAL UNIT(S): at 11:10

## 2021-06-06 RX ADMIN — Medication 1000 UNIT(S): at 11:10

## 2021-06-06 RX ADMIN — Medication 500 MILLIGRAM(S): at 11:10

## 2021-06-06 RX ADMIN — Medication 1 DROP(S): at 05:54

## 2021-06-06 RX ADMIN — DORZOLAMIDE HYDROCHLORIDE TIMOLOL MALEATE 1 DROP(S): 20; 5 SOLUTION/ DROPS OPHTHALMIC at 17:25

## 2021-06-07 LAB
ANION GAP SERPL CALC-SCNC: 12 MMOL/L — SIGNIFICANT CHANGE UP (ref 7–14)
BUN SERPL-MCNC: 30 MG/DL — HIGH (ref 7–23)
CALCIUM SERPL-MCNC: 8.8 MG/DL — SIGNIFICANT CHANGE UP (ref 8.4–10.5)
CHLORIDE SERPL-SCNC: 100 MMOL/L — SIGNIFICANT CHANGE UP (ref 98–107)
CO2 SERPL-SCNC: 24 MMOL/L — SIGNIFICANT CHANGE UP (ref 22–31)
CREAT SERPL-MCNC: 1.38 MG/DL — HIGH (ref 0.5–1.3)
GLUCOSE SERPL-MCNC: 90 MG/DL — SIGNIFICANT CHANGE UP (ref 70–99)
HCT VFR BLD CALC: 32.1 % — LOW (ref 34.5–45)
HGB BLD-MCNC: 10.3 G/DL — LOW (ref 11.5–15.5)
MAGNESIUM SERPL-MCNC: 1.9 MG/DL — SIGNIFICANT CHANGE UP (ref 1.6–2.6)
MCHC RBC-ENTMCNC: 28.6 PG — SIGNIFICANT CHANGE UP (ref 27–34)
MCHC RBC-ENTMCNC: 32.1 GM/DL — SIGNIFICANT CHANGE UP (ref 32–36)
MCV RBC AUTO: 89.2 FL — SIGNIFICANT CHANGE UP (ref 80–100)
NRBC # BLD: 0 /100 WBCS — SIGNIFICANT CHANGE UP
NRBC # FLD: 0 K/UL — SIGNIFICANT CHANGE UP
NT-PROBNP SERPL-SCNC: 7029 PG/ML — HIGH
PHOSPHATE SERPL-MCNC: 3.5 MG/DL — SIGNIFICANT CHANGE UP (ref 2.5–4.5)
PLATELET # BLD AUTO: 155 K/UL — SIGNIFICANT CHANGE UP (ref 150–400)
POTASSIUM SERPL-MCNC: 4.1 MMOL/L — SIGNIFICANT CHANGE UP (ref 3.5–5.3)
POTASSIUM SERPL-SCNC: 4.1 MMOL/L — SIGNIFICANT CHANGE UP (ref 3.5–5.3)
RBC # BLD: 3.6 M/UL — LOW (ref 3.8–5.2)
RBC # FLD: 15.1 % — HIGH (ref 10.3–14.5)
SODIUM SERPL-SCNC: 136 MMOL/L — SIGNIFICANT CHANGE UP (ref 135–145)
WBC # BLD: 5.57 K/UL — SIGNIFICANT CHANGE UP (ref 3.8–10.5)
WBC # FLD AUTO: 5.57 K/UL — SIGNIFICANT CHANGE UP (ref 3.8–10.5)

## 2021-06-07 PROCEDURE — 93306 TTE W/DOPPLER COMPLETE: CPT | Mod: 26

## 2021-06-07 RX ADMIN — SERTRALINE 150 MILLIGRAM(S): 25 TABLET, FILM COATED ORAL at 21:22

## 2021-06-07 RX ADMIN — Medication 1 DROP(S): at 12:40

## 2021-06-07 RX ADMIN — Medication 500 MILLIGRAM(S): at 12:38

## 2021-06-07 RX ADMIN — Medication 1 DROP(S): at 17:34

## 2021-06-07 RX ADMIN — Medication 1 TABLET(S): at 17:31

## 2021-06-07 RX ADMIN — DORZOLAMIDE HYDROCHLORIDE TIMOLOL MALEATE 1 DROP(S): 20; 5 SOLUTION/ DROPS OPHTHALMIC at 05:45

## 2021-06-07 RX ADMIN — GABAPENTIN 100 MILLIGRAM(S): 400 CAPSULE ORAL at 12:38

## 2021-06-07 RX ADMIN — Medication 1 DROP(S): at 05:44

## 2021-06-07 RX ADMIN — ENOXAPARIN SODIUM 30 MILLIGRAM(S): 100 INJECTION SUBCUTANEOUS at 12:37

## 2021-06-07 RX ADMIN — DORZOLAMIDE HYDROCHLORIDE TIMOLOL MALEATE 1 DROP(S): 20; 5 SOLUTION/ DROPS OPHTHALMIC at 17:34

## 2021-06-07 RX ADMIN — LATANOPROST 1 DROP(S): 0.05 SOLUTION/ DROPS OPHTHALMIC; TOPICAL at 21:22

## 2021-06-07 RX ADMIN — GABAPENTIN 100 MILLIGRAM(S): 400 CAPSULE ORAL at 05:43

## 2021-06-07 RX ADMIN — ATORVASTATIN CALCIUM 20 MILLIGRAM(S): 80 TABLET, FILM COATED ORAL at 21:21

## 2021-06-07 RX ADMIN — Medication 25 MILLIGRAM(S): at 05:43

## 2021-06-07 RX ADMIN — LOSARTAN POTASSIUM 100 MILLIGRAM(S): 100 TABLET, FILM COATED ORAL at 05:43

## 2021-06-07 RX ADMIN — Medication 25 MILLIGRAM(S): at 17:31

## 2021-06-07 RX ADMIN — Medication 40 MILLIGRAM(S): at 05:43

## 2021-06-07 RX ADMIN — Medication 400 INTERNATIONAL UNIT(S): at 12:39

## 2021-06-07 RX ADMIN — Medication 1000 UNIT(S): at 12:39

## 2021-06-07 RX ADMIN — GABAPENTIN 100 MILLIGRAM(S): 400 CAPSULE ORAL at 21:22

## 2021-06-07 NOTE — CHART NOTE - NSCHARTNOTEFT_GEN_A_CORE
Echocardiogram results reported to Dr. Roman. As per Dr. Roman patient ordered for STEPHANIE for AV evaluation.

## 2021-06-08 LAB
ANION GAP SERPL CALC-SCNC: 14 MMOL/L — SIGNIFICANT CHANGE UP (ref 7–14)
BUN SERPL-MCNC: 40 MG/DL — HIGH (ref 7–23)
CALCIUM SERPL-MCNC: 9 MG/DL — SIGNIFICANT CHANGE UP (ref 8.4–10.5)
CHLORIDE SERPL-SCNC: 98 MMOL/L — SIGNIFICANT CHANGE UP (ref 98–107)
CO2 SERPL-SCNC: 24 MMOL/L — SIGNIFICANT CHANGE UP (ref 22–31)
CREAT SERPL-MCNC: 1.53 MG/DL — HIGH (ref 0.5–1.3)
GLUCOSE SERPL-MCNC: 101 MG/DL — HIGH (ref 70–99)
HCT VFR BLD CALC: 34.5 % — SIGNIFICANT CHANGE UP (ref 34.5–45)
HGB BLD-MCNC: 10.8 G/DL — LOW (ref 11.5–15.5)
MAGNESIUM SERPL-MCNC: 2 MG/DL — SIGNIFICANT CHANGE UP (ref 1.6–2.6)
MCHC RBC-ENTMCNC: 27.7 PG — SIGNIFICANT CHANGE UP (ref 27–34)
MCHC RBC-ENTMCNC: 31.3 GM/DL — LOW (ref 32–36)
MCV RBC AUTO: 88.5 FL — SIGNIFICANT CHANGE UP (ref 80–100)
NRBC # BLD: 0 /100 WBCS — SIGNIFICANT CHANGE UP
NRBC # FLD: 0 K/UL — SIGNIFICANT CHANGE UP
PHOSPHATE SERPL-MCNC: 3.9 MG/DL — SIGNIFICANT CHANGE UP (ref 2.5–4.5)
PLATELET # BLD AUTO: 155 K/UL — SIGNIFICANT CHANGE UP (ref 150–400)
POTASSIUM SERPL-MCNC: 3.5 MMOL/L — SIGNIFICANT CHANGE UP (ref 3.5–5.3)
POTASSIUM SERPL-SCNC: 3.5 MMOL/L — SIGNIFICANT CHANGE UP (ref 3.5–5.3)
RBC # BLD: 3.9 M/UL — SIGNIFICANT CHANGE UP (ref 3.8–5.2)
RBC # FLD: 15.1 % — HIGH (ref 10.3–14.5)
SODIUM SERPL-SCNC: 136 MMOL/L — SIGNIFICANT CHANGE UP (ref 135–145)
WBC # BLD: 5.86 K/UL — SIGNIFICANT CHANGE UP (ref 3.8–10.5)
WBC # FLD AUTO: 5.86 K/UL — SIGNIFICANT CHANGE UP (ref 3.8–10.5)

## 2021-06-08 PROCEDURE — 93325 DOPPLER ECHO COLOR FLOW MAPG: CPT | Mod: 26,GC

## 2021-06-08 PROCEDURE — 93320 DOPPLER ECHO COMPLETE: CPT | Mod: 26,GC

## 2021-06-08 PROCEDURE — 76376 3D RENDER W/INTRP POSTPROCES: CPT | Mod: 26

## 2021-06-08 PROCEDURE — 93312 ECHO TRANSESOPHAGEAL: CPT | Mod: 26

## 2021-06-08 RX ORDER — SACUBITRIL AND VALSARTAN 24; 26 MG/1; MG/1
1 TABLET, FILM COATED ORAL
Refills: 0 | Status: DISCONTINUED | OUTPATIENT
Start: 2021-06-08 | End: 2021-06-08

## 2021-06-08 RX ORDER — SACUBITRIL AND VALSARTAN 24; 26 MG/1; MG/1
1 TABLET, FILM COATED ORAL
Refills: 0 | Status: DISCONTINUED | OUTPATIENT
Start: 2021-06-09 | End: 2021-06-09

## 2021-06-08 RX ADMIN — ENOXAPARIN SODIUM 30 MILLIGRAM(S): 100 INJECTION SUBCUTANEOUS at 09:04

## 2021-06-08 RX ADMIN — LOSARTAN POTASSIUM 100 MILLIGRAM(S): 100 TABLET, FILM COATED ORAL at 04:36

## 2021-06-08 RX ADMIN — Medication 500 MILLIGRAM(S): at 09:05

## 2021-06-08 RX ADMIN — LATANOPROST 1 DROP(S): 0.05 SOLUTION/ DROPS OPHTHALMIC; TOPICAL at 21:18

## 2021-06-08 RX ADMIN — Medication 1 DROP(S): at 04:36

## 2021-06-08 RX ADMIN — Medication 1 TABLET(S): at 17:06

## 2021-06-08 RX ADMIN — DORZOLAMIDE HYDROCHLORIDE TIMOLOL MALEATE 1 DROP(S): 20; 5 SOLUTION/ DROPS OPHTHALMIC at 04:37

## 2021-06-08 RX ADMIN — GABAPENTIN 100 MILLIGRAM(S): 400 CAPSULE ORAL at 21:18

## 2021-06-08 RX ADMIN — GABAPENTIN 100 MILLIGRAM(S): 400 CAPSULE ORAL at 04:36

## 2021-06-08 RX ADMIN — SERTRALINE 150 MILLIGRAM(S): 25 TABLET, FILM COATED ORAL at 21:19

## 2021-06-08 RX ADMIN — GABAPENTIN 100 MILLIGRAM(S): 400 CAPSULE ORAL at 09:05

## 2021-06-08 RX ADMIN — Medication 1 DROP(S): at 17:06

## 2021-06-08 RX ADMIN — Medication 30 MILLILITER(S): at 21:57

## 2021-06-08 RX ADMIN — Medication 25 MILLIGRAM(S): at 04:36

## 2021-06-08 RX ADMIN — Medication 25 MILLIGRAM(S): at 17:05

## 2021-06-08 RX ADMIN — Medication 1000 UNIT(S): at 09:05

## 2021-06-08 RX ADMIN — Medication 1 DROP(S): at 09:05

## 2021-06-08 RX ADMIN — Medication 400 INTERNATIONAL UNIT(S): at 09:04

## 2021-06-08 RX ADMIN — ATORVASTATIN CALCIUM 20 MILLIGRAM(S): 80 TABLET, FILM COATED ORAL at 21:18

## 2021-06-08 RX ADMIN — DORZOLAMIDE HYDROCHLORIDE TIMOLOL MALEATE 1 DROP(S): 20; 5 SOLUTION/ DROPS OPHTHALMIC at 17:06

## 2021-06-08 RX ADMIN — Medication 40 MILLIGRAM(S): at 04:36

## 2021-06-08 NOTE — PHYSICAL THERAPY INITIAL EVALUATION ADULT - CRITERIA FOR SKILLED THERAPEUTIC INTERVENTIONS
Problem: Patient Care Overview  Goal: Plan of Care Review  Outcome: Ongoing (interventions implemented as appropriate)  Recommendations     Recommendation/Intervention:  1. Resume tube feed.  2. Continue current TF regimen. 3. Will continue to monitor.   Goals: Meet > 85 % EEN/EPN while admitted   Nutrition Goal Status: Continues  Communication of RD Recs:  Reviewed with RN       impairments found/rehab potential

## 2021-06-08 NOTE — PHYSICAL THERAPY INITIAL EVALUATION ADULT - GENERAL OBSERVATIONS, REHAB EVAL
Patient received semi supine in bed , (+) tele monitor , (+) , [place] (+) primafit , pt in no apparent distress.

## 2021-06-08 NOTE — PHYSICAL THERAPY INITIAL EVALUATION ADULT - ACTIVE RANGE OF MOTION EXAMINATION, REHAB EVAL
except bilateral shoulder flexion 0-60 degrees/bilateral upper extremity Active ROM was WFL (within functional limits)/bilateral  lower extremity Active ROM was WFL (within functional limits)

## 2021-06-08 NOTE — CHART NOTE - NSCHARTNOTEFT_GEN_A_CORE
Patient underwent STEPHANIE today, Dr. Roman reviewed results and spoke with pt daughter Gabriela [phone] who does not want to pursue cath at this time. Plan to start Entresto 24mg BID at this time as per Dr. Roman. Will evaluate creatinine in AM and plan for possible discharge home. Patient underwent STEPHANIE today, Dr. Roman reviewed results and spoke with pt daughter Gabriela [phone] who does not want to pursue cath at this time. Plan to d/c losartan and start Entresto 24mg BID 6/9 as per Dr. Roman. Will evaluate creatinine in AM and plan for possible discharge home.

## 2021-06-09 ENCOUNTER — TRANSCRIPTION ENCOUNTER (OUTPATIENT)
Age: 86
End: 2021-06-09

## 2021-06-09 VITALS
OXYGEN SATURATION: 97 % | RESPIRATION RATE: 18 BRPM | SYSTOLIC BLOOD PRESSURE: 117 MMHG | DIASTOLIC BLOOD PRESSURE: 62 MMHG | HEART RATE: 60 BPM | TEMPERATURE: 98 F

## 2021-06-09 LAB
ANION GAP SERPL CALC-SCNC: 16 MMOL/L — HIGH (ref 7–14)
BUN SERPL-MCNC: 43 MG/DL — HIGH (ref 7–23)
CALCIUM SERPL-MCNC: 8.8 MG/DL — SIGNIFICANT CHANGE UP (ref 8.4–10.5)
CHLORIDE SERPL-SCNC: 98 MMOL/L — SIGNIFICANT CHANGE UP (ref 98–107)
CO2 SERPL-SCNC: 21 MMOL/L — LOW (ref 22–31)
CREAT SERPL-MCNC: 1.43 MG/DL — HIGH (ref 0.5–1.3)
GLUCOSE SERPL-MCNC: 114 MG/DL — HIGH (ref 70–99)
HCT VFR BLD CALC: 32.9 % — LOW (ref 34.5–45)
HGB BLD-MCNC: 10.4 G/DL — LOW (ref 11.5–15.5)
MAGNESIUM SERPL-MCNC: 1.8 MG/DL — SIGNIFICANT CHANGE UP (ref 1.6–2.6)
MCHC RBC-ENTMCNC: 27.9 PG — SIGNIFICANT CHANGE UP (ref 27–34)
MCHC RBC-ENTMCNC: 31.6 GM/DL — LOW (ref 32–36)
MCV RBC AUTO: 88.2 FL — SIGNIFICANT CHANGE UP (ref 80–100)
NRBC # BLD: 0 /100 WBCS — SIGNIFICANT CHANGE UP
NRBC # FLD: 0 K/UL — SIGNIFICANT CHANGE UP
PHOSPHATE SERPL-MCNC: 4.1 MG/DL — SIGNIFICANT CHANGE UP (ref 2.5–4.5)
PLATELET # BLD AUTO: 157 K/UL — SIGNIFICANT CHANGE UP (ref 150–400)
POTASSIUM SERPL-MCNC: 3.5 MMOL/L — SIGNIFICANT CHANGE UP (ref 3.5–5.3)
POTASSIUM SERPL-SCNC: 3.5 MMOL/L — SIGNIFICANT CHANGE UP (ref 3.5–5.3)
RBC # BLD: 3.73 M/UL — LOW (ref 3.8–5.2)
RBC # FLD: 15.3 % — HIGH (ref 10.3–14.5)
SODIUM SERPL-SCNC: 135 MMOL/L — SIGNIFICANT CHANGE UP (ref 135–145)
WBC # BLD: 6.4 K/UL — SIGNIFICANT CHANGE UP (ref 3.8–10.5)
WBC # FLD AUTO: 6.4 K/UL — SIGNIFICANT CHANGE UP (ref 3.8–10.5)

## 2021-06-09 RX ORDER — HYDRALAZINE HCL 50 MG
1 TABLET ORAL
Qty: 90 | Refills: 0
Start: 2021-06-09 | End: 2021-07-08

## 2021-06-09 RX ORDER — DIMENHYDRINATE 50 MG
1 TABLET ORAL
Qty: 0 | Refills: 0 | DISCHARGE

## 2021-06-09 RX ORDER — SACUBITRIL AND VALSARTAN 24; 26 MG/1; MG/1
1 TABLET, FILM COATED ORAL
Qty: 60 | Refills: 0
Start: 2021-06-09 | End: 2021-07-08

## 2021-06-09 RX ORDER — ROSUVASTATIN CALCIUM 5 MG/1
1 TABLET ORAL
Qty: 0 | Refills: 0 | DISCHARGE

## 2021-06-09 RX ORDER — ENOXAPARIN SODIUM 100 MG/ML
40 INJECTION SUBCUTANEOUS
Qty: 0 | Refills: 0 | DISCHARGE

## 2021-06-09 RX ORDER — FUROSEMIDE 40 MG
1 TABLET ORAL
Qty: 30 | Refills: 0
Start: 2021-06-09 | End: 2021-07-08

## 2021-06-09 RX ORDER — ACETAMINOPHEN 500 MG
2 TABLET ORAL
Qty: 0 | Refills: 0 | DISCHARGE
Start: 2021-06-09

## 2021-06-09 RX ORDER — ATORVASTATIN CALCIUM 80 MG/1
1 TABLET, FILM COATED ORAL
Qty: 30 | Refills: 0
Start: 2021-06-09 | End: 2021-07-08

## 2021-06-09 RX ORDER — LOSARTAN/HYDROCHLOROTHIAZIDE 100MG-25MG
1 TABLET ORAL
Qty: 0 | Refills: 0 | DISCHARGE

## 2021-06-09 RX ADMIN — Medication 1000 UNIT(S): at 08:49

## 2021-06-09 RX ADMIN — Medication 25 MILLIGRAM(S): at 05:32

## 2021-06-09 RX ADMIN — SACUBITRIL AND VALSARTAN 1 TABLET(S): 24; 26 TABLET, FILM COATED ORAL at 05:32

## 2021-06-09 RX ADMIN — Medication 1 DROP(S): at 08:50

## 2021-06-09 RX ADMIN — GABAPENTIN 100 MILLIGRAM(S): 400 CAPSULE ORAL at 08:49

## 2021-06-09 RX ADMIN — Medication 400 INTERNATIONAL UNIT(S): at 08:49

## 2021-06-09 RX ADMIN — DORZOLAMIDE HYDROCHLORIDE TIMOLOL MALEATE 1 DROP(S): 20; 5 SOLUTION/ DROPS OPHTHALMIC at 05:33

## 2021-06-09 RX ADMIN — Medication 1 DROP(S): at 05:31

## 2021-06-09 RX ADMIN — Medication 40 MILLIGRAM(S): at 05:33

## 2021-06-09 RX ADMIN — GABAPENTIN 100 MILLIGRAM(S): 400 CAPSULE ORAL at 05:33

## 2021-06-09 RX ADMIN — Medication 500 MILLIGRAM(S): at 08:49

## 2021-06-09 RX ADMIN — ENOXAPARIN SODIUM 30 MILLIGRAM(S): 100 INJECTION SUBCUTANEOUS at 08:50

## 2021-06-09 NOTE — DISCHARGE NOTE PROVIDER - NSDCCPCAREPLAN_GEN_ALL_CORE_FT
PRINCIPAL DISCHARGE DIAGNOSIS  Diagnosis: CHF (congestive heart failure)  Assessment and Plan of Treatment: You had a transesophogeal echo done this admission with severe aortic stenosis. Continue entresto as prescribed. Follow up with Dr. Roman in 1 week to monitor your heart condition and modify  medications as needed.      SECONDARY DISCHARGE DIAGNOSES  Diagnosis: Hypertension, unspecified type  Assessment and Plan of Treatment: Continue blood pressure medication regimen as directed. Monitor for any visual changes, headaches or dizziness.  Monitor blood pressure regularly.  Follow up with your PCP for further management for high blood pressure.       PRINCIPAL DISCHARGE DIAGNOSIS  Diagnosis: CHF (congestive heart failure)  Assessment and Plan of Treatment: You had a transesophogeal echo done this admission with severe aortic stenosis. Continue entresto as prescribed. An appointmetn with Dr. Roman has been made for 6/14/21 at 1:30 pm to monitor your heart condition and modify  medications as needed. If you need to change your appointment please call Dr. Roman office [phone].      SECONDARY DISCHARGE DIAGNOSES  Diagnosis: Hypertension, unspecified type  Assessment and Plan of Treatment: Continue blood pressure medication regimen as directed. Monitor for any visual changes, headaches or dizziness.  Monitor blood pressure regularly.  Follow up with your PCP for further management for high blood pressure.

## 2021-06-09 NOTE — DISCHARGE NOTE PROVIDER - PROVIDER TOKENS
PROVIDER:[TOKEN:[[id number]],FOLLOWUP:[1 week]] PROVIDER:[TOKEN:[[id number]],SCHEDULEDAPPT:[06/14/2021],SCHEDULEDAPPTTIME:[01:30 PM]]

## 2021-06-09 NOTE — PROGRESS NOTE ADULT - SUBJECTIVE AND OBJECTIVE BOX
Patient is a 89y old  Female who presents with a chief complaint of TORIBIO (05 Jun 2021 08:09)      INTERVAL HPI/OVERNIGHT EVENTS: none     MEDICATIONS  (STANDING):  ascorbic acid 500 milliGRAM(s) Oral daily  atorvastatin 20 milliGRAM(s) Oral at bedtime  atropine 1% Solution 1 Drop(s) Left EYE two times a day  cholecalciferol 1000 Unit(s) Oral daily  dorzolamide 2%/timolol 0.5% Ophthalmic Solution 1 Drop(s) Left EYE two times a day  enoxaparin Injectable 30 milliGRAM(s) SubCutaneous daily  furosemide   Injectable 40 milliGRAM(s) IV Push daily  gabapentin 100 milliGRAM(s) Oral three times a day  hydrALAZINE 25 milliGRAM(s) Oral two times a day  latanoprost 0.005% Ophthalmic Solution 1 Drop(s) Both EYES at bedtime  losartan 100 milliGRAM(s) Oral daily  magnesium oxide 400 milliGRAM(s) Oral three times a day with meals  metoprolol succinate ER 25 milliGRAM(s) Oral daily  potassium chloride   Powder 40 milliEquivalent(s) Oral every 4 hours  prednisoLONE acetate 1% Suspension 1 Drop(s) Left EYE daily  sertraline 150 milliGRAM(s) Oral at bedtime  vitamin E 400 International Unit(s) Oral daily    MEDICATIONS  (PRN):  acetaminophen   Tablet .. 650 milliGRAM(s) Oral every 6 hours PRN Moderate Pain (4 - 6)  ALPRAZolam 0.5 milliGRAM(s) Oral at bedtime PRN anxiety  diphenoxylate/atropine 1 Tablet(s) Oral every 6 hours PRN Diarrhea        Allergies    codeine (Other)  Motrin (Nausea)  Nitroglycerine-passed out (Other)  PCN (Anaphylaxis)  penicillins (Anaphylaxis)    Intolerances        REVIEW OF SYSTEMS:  CARDIOVASCULAR: No chest pain, palpitations, dizziness, or leg swelling; no shortness of breath     RESPIRATORY: No cough, wheezing, chills or hemoptysis; No shortness of breath    GASTROINTESTINAL: No abdominal or epigastric pain. No nausea, vomiting, or hematemesis; No diarrhea or constipation. No melena or hematochezia.c/o heartburn     NEUROLOGICAL: No headaches, memory loss, loss of strength, numbness      PHYSICAL EXAM:  Vital Signs Last 24 Hrs  T(C): 36.8 (06 Jun 2021 05:49), Max: 36.8 (05 Jun 2021 21:21)  T(F): 98.2 (06 Jun 2021 05:49), Max: 98.2 (05 Jun 2021 21:21)  HR: 60 (06 Jun 2021 07:37) (60 - 62)  BP: 158/76 (06 Jun 2021 07:37) (144/78 - 187/92)  BP(mean): --  RR: 16 (06 Jun 2021 07:37) (16 - 17)  SpO2: 100% (06 Jun 2021 07:37) (100% - 100%)    GENERAL: NAD, well-groomed, well-developed  HEAD:  Atraumatic, Normocephalic  EYES: EOMI, PERRLA, conjunctiva and sclera clear  NECK: Supple, No JVD, Normal thyroid  NERVOUS SYSTEM:  Alert & Oriented X3, Good concentration;  and symmetric  CHEST/LUNG: Clear to auscultation bilaterally; No rales, rhonchi, wheezing, or rubs  HEART: S1S2 regular, with III/VI ATTILA Left base, aortic area, without  rub nor gallop  ABDOMEN: Soft, Nontender, Nondistended; Bowel sounds present  EXTREMITIES:  2+ Peripheral Pulses, No clubbing, cyanosis, or edema      LABS:                        10.3   5.08  )-----------( 137      ( 06 Jun 2021 06:57 )             31.9     06 Jun 2021 06:57    136    |  98     |  30     ----------------------------<  137    3.3     |  24     |  1.47     Ca    8.8        06 Jun 2021 06:57  Phos  3.5       06 Jun 2021 06:57  Mg     1.8       06 Jun 2021 06:57        CAPILLARY BLOOD GLUCOSE          TELEMETRY: AV Paced       Assessment and Plan:   Problem/Plan - 1:  ·  Problem: Acute congestive heart failure, unspecified heart failure type.  Plan: H/o valvular disease.  For echo.  Diurese  change to PO   Consider Entresto pending echo . Daily weights - down 1.4 kg       Problem/Plan - 2:  ·  Problem: Hypertension, unspecified type.  Plan: add low dose Hydralizine for afterload reduction. Advance as needed.  Check f/u labs     Problem/Plan - 3:  ·  Problem: Mitral valve prolapse.  Plan: for f/u echocardiogram.     DVT prophylaxis     D/W NP 
Patient is a 89y old  Female who presents with a chief complaint of TORIBIO (09 Jun 2021 08:10)      INTERVAL HPI/OVERNIGHT EVENTS: see STEPHANIE     MEDICATIONS  (STANDING):  ascorbic acid 500 milliGRAM(s) Oral daily  atorvastatin 20 milliGRAM(s) Oral at bedtime  atropine 1% Solution 1 Drop(s) Left EYE two times a day  cholecalciferol 1000 Unit(s) Oral daily  dorzolamide 2%/timolol 0.5% Ophthalmic Solution 1 Drop(s) Left EYE two times a day  enoxaparin Injectable 30 milliGRAM(s) SubCutaneous daily  furosemide    Tablet 40 milliGRAM(s) Oral daily  gabapentin 100 milliGRAM(s) Oral three times a day  hydrALAZINE 25 milliGRAM(s) Oral two times a day  latanoprost 0.005% Ophthalmic Solution 1 Drop(s) Both EYES at bedtime  metoprolol succinate ER 25 milliGRAM(s) Oral daily  prednisoLONE acetate 1% Suspension 1 Drop(s) Left EYE daily  sacubitril 24 mG/valsartan 26 mG 1 Tablet(s) Oral two times a day  sertraline 150 milliGRAM(s) Oral at bedtime  vitamin E 400 International Unit(s) Oral daily    MEDICATIONS  (PRN):  acetaminophen   Tablet .. 650 milliGRAM(s) Oral every 6 hours PRN Moderate Pain (4 - 6)  ALPRAZolam 0.5 milliGRAM(s) Oral at bedtime PRN anxiety  aluminum hydroxide/magnesium hydroxide/simethicone Suspension 30 milliLiter(s) Oral every 4 hours PRN Dyspepsia  diphenoxylate/atropine 1 Tablet(s) Oral every 6 hours PRN Diarrhea        Allergies    codeine (Other)  Motrin (Nausea)  Nitroglycerine-passed out (Other)  PCN (Anaphylaxis)  penicillins (Anaphylaxis)    Intolerances        REVIEW OF SYSTEMS:  CARDIOVASCULAR: No chest pain, palpitations, dizziness, or leg swelling; no shortness of breath     RESPIRATORY: No cough, wheezing, chills or hemoptysis; No shortness of breath    GASTROINTESTINAL: C/O heartburn from one pill. No  or hematemesis; No diarrhea or constipation. No melena or hematochezia.    NEUROLOGICAL: No headaches, memory loss, loss of strength, numbness      PHYSICAL EXAM:  Vital Signs Last 24 Hrs  T(C): 36.6 (09 Jun 2021 05:30), Max: 36.9 (08 Jun 2021 15:46)  T(F): 97.9 (09 Jun 2021 05:30), Max: 98.4 (08 Jun 2021 15:46)  HR: 60 (09 Jun 2021 05:30) (60 - 66)  BP: 154/69 (09 Jun 2021 05:30) (102/60 - 154/69)  BP(mean): --  RR: 18 (09 Jun 2021 05:30) (18 - 18)  SpO2: 98% (09 Jun 2021 05:30) (98% - 100%)    GENERAL: NAD, well-groomed, well-developed  HEAD:  Atraumatic, Normocephalic  EYES: EOMI, PERRLA, conjunctiva and sclera clear  NECK: Supple, No JVD, Normal thyroid  NERVOUS SYSTEM:  Alert & Oriented X3, Good concentration;  and symmetric  CHEST/LUNG: Clear to auscultation bilaterally; No rales, rhonchi, wheezing, or rubs  HEART: S1S2 regular, with II-III/VI ATTILA Left base, aortic area, no , rub nor gallop  ABDOMEN: Soft, Nontender, Nondistended; Bowel sounds present  EXTREMITIES:  , No clubbing, cyanosis, or edema      LABS:                        10.4   6.40  )-----------( 157      ( 09 Jun 2021 07:30 )             32.9     09 Jun 2021 07:30    135    |  98     |  43     ----------------------------<  114    3.5     |  21     |  1.43     Ca    8.8        09 Jun 2021 07:30  Phos  4.1       09 Jun 2021 07:30  Mg     1.8       09 Jun 2021 07:30        CAPILLARY BLOOD GLUCOSE          TELEMETRY: AV paced     < from: STEPHANIE w/o TTE (w/3D Echo) (06.08.21 @ 15:33) >  CONCLUSIONS:  1. Mitral annular calcification, otherwise normal mitral  valve. Moderate mitral regurgitation.  Vena contracta width  about  0.4 cm.  2. Calcified trileaflet aortic valve with decreased  opening. Estimated aortic valve area equals 1 sqcm (by  planimetry), consistent with moderate to severe aortic  stenosis. Mild-moderate aortic regurgitation.  Vena  contracta width about  0.3 cm.  3. Normal aortic root.  Mild non-mobile atherosclerotic  plaque in the aortic arch and descending thoracic aorta.  4. Normal left atrium.  No left atrial or left atrial  appendage thrombus.  5. Normal left ventricular systolic function. No segmental  wall motion abnormalities.  6. Normal right ventricular size and function.  A device  wire is noted in the right heart.  7. Contrast injection demonstrates no evidence of a patent  foramen ovale.    < end of copied text >           Assessment and Plan:   Problem/Plan - 1:  ·  Problem: Acute congestive heart failure, unspecified heart failure type.  Plan: H/o valvular disease.  See echo.   family wants conservative management.   Will follow on PO Lasix  and  Entresto Daily weights - down 4.6 kg   She ambulated to BR on RA without TORIBIO     Problem/Plan - 2:  ·  Problem: Hypertension, unspecified type.  Plan: added  Hydralizine 25 bid  for afterload reduction. Advance to tid  and Entresto     Problem/Plan - 3:  ·  Problem:hypokalemia   to replenish   This pill may be the cause of heartburn.   Can give liquid, and d/c .  Decrease Lasix to 20     DVT prophylaxis      Care Discussed with Consultants/Other Providers: Daughter, and Hanh, ACP 
Patient is a 89y old  Female who presents with a chief complaint of TORIBIO (04 Jun 2021 16:19)      INTERVAL HPI/OVERNIGHT EVENTS: admitted    MEDICATIONS  (STANDING):  ascorbic acid 500 milliGRAM(s) Oral daily  atorvastatin 20 milliGRAM(s) Oral at bedtime  cholecalciferol 1000 Unit(s) Oral daily  enoxaparin Injectable 30 milliGRAM(s) SubCutaneous daily  furosemide   Injectable 40 milliGRAM(s) IV Push daily  gabapentin 100 milliGRAM(s) Oral three times a day  hydrALAZINE 25 milliGRAM(s) Oral two times a day  losartan 100 milliGRAM(s) Oral daily  metoprolol succinate ER 25 milliGRAM(s) Oral daily  sertraline 150 milliGRAM(s) Oral at bedtime  vitamin E 400 International Unit(s) Oral daily    MEDICATIONS  (PRN):  acetaminophen   Tablet .. 650 milliGRAM(s) Oral every 6 hours PRN Moderate Pain (4 - 6)  ALPRAZolam 0.5 milliGRAM(s) Oral at bedtime PRN anxiety        1 @ 12:44)  Complete Blood Count: STAT (06-04-21 @ 12:44)      Allergies    codeine (Other)  Motrin (Nausea)  Nitroglycerine-passed out (Other)  PCN (Anaphylaxis)  penicillins (Anaphylaxis)    Intolerances        REVIEW OF SYSTEMS:  CARDIOVASCULAR: No chest pain, palpitations, dizziness, or leg swelling; no shortness of breath     RESPIRATORY: No cough, wheezing, chills or hemoptysis; No shortness of breath    GASTROINTESTINAL: No abdominal or epigastric pain. No nausea, vomiting, or hematemesis; No diarrhea or constipation. No melena or hematochezia.    NEUROLOGICAL: No headaches, memory loss, loss of strength, numbness      PHYSICAL EXAM:  Vital Signs Last 24 Hrs  T(C): 37 (05 Jun 2021 05:25), Max: 37 (05 Jun 2021 05:25)  T(F): 98.6 (05 Jun 2021 05:25), Max: 98.6 (05 Jun 2021 05:25)  HR: 60 (05 Jun 2021 05:25) (59 - 68)  BP: 168/81 (05 Jun 2021 05:25) (156/84 - 196/78)  BP(mean): --  RR: 18 (05 Jun 2021 05:25) (18 - 20)  SpO2: 99% (05 Jun 2021 05:25) (97% - 100%)    GENERAL: NAD, well-groomed, well-developed  HEAD:  Atraumatic, Normocephalic  EYES: EOMI, PERRLA, conjunctiva and sclera clear  NECK: Supple, No JVD, Normal thyroid  NERVOUS SYSTEM:  Alert & Oriented X3, Good concentration;  and symmetric  CHEST/LUNG: Decreased breath sounds at bases.   HEART: S4S1S2 regular, with  III/VI ATTILA left base, aortic area, without rub  ABDOMEN: Soft, Nontender, Nondistended; Bowel sounds present  EXTREMITIES:  trace Peripheral Pulses, No clubbing, cyanosis, or edema      LABS:             Today's labs pending           CAPILLARY BLOOD GLUCOSE          TELEMETRY  AV Paced     Assessment and Plan:   Problem/Plan - 1:  ·  Problem: Acute congestive heart failure, unspecified heart failure type.  Plan: H/o valvular disease.  For echo.  Diurese   Consider Entresto. Daily weights     Problem/Plan - 2:  ·  Problem: Hypertension, unspecified type.  Plan: add low dose Hydralizine for afterload reduction. Advance as needed.  Check f/u labs     Problem/Plan - 3:  ·  Problem: Mitral valve prolapse.  Plan: for f/u echocardiogram. 
Patient is a 89y old  Female who presents with a chief complaint of TORIBIO (06 Jun 2021 10:59)      INTERVAL HPI/OVERNIGHT EVENTS:  none     MEDICATIONS  (STANDING):  ascorbic acid 500 milliGRAM(s) Oral daily  atorvastatin 20 milliGRAM(s) Oral at bedtime  atropine 1% Solution 1 Drop(s) Left EYE two times a day  cholecalciferol 1000 Unit(s) Oral daily  dorzolamide 2%/timolol 0.5% Ophthalmic Solution 1 Drop(s) Left EYE two times a day  enoxaparin Injectable 30 milliGRAM(s) SubCutaneous daily  furosemide    Tablet 40 milliGRAM(s) Oral daily  gabapentin 100 milliGRAM(s) Oral three times a day  hydrALAZINE 25 milliGRAM(s) Oral two times a day  latanoprost 0.005% Ophthalmic Solution 1 Drop(s) Both EYES at bedtime  losartan 100 milliGRAM(s) Oral daily  metoprolol succinate ER 25 milliGRAM(s) Oral daily  prednisoLONE acetate 1% Suspension 1 Drop(s) Left EYE daily  sertraline 150 milliGRAM(s) Oral at bedtime  vitamin E 400 International Unit(s) Oral daily    MEDICATIONS  (PRN):  acetaminophen   Tablet .. 650 milliGRAM(s) Oral every 6 hours PRN Moderate Pain (4 - 6)  ALPRAZolam 0.5 milliGRAM(s) Oral at bedtime PRN anxiety  diphenoxylate/atropine 1 Tablet(s) Oral every 6 hours PRN Diarrhea            Allergies    codeine (Other)  Motrin (Nausea)  Nitroglycerine-passed out (Other)  PCN (Anaphylaxis)  penicillins (Anaphylaxis)    Intolerances        REVIEW OF SYSTEMS:  CARDIOVASCULAR: No chest pain, palpitations, dizziness, or leg swelling; no shortness of breath     RESPIRATORY: No cough, wheezing, chills or hemoptysis; No shortness of breath    GASTROINTESTINAL: No abdominal or epigastric pain. No nausea, vomiting, or hematemesis; No diarrhea or constipation. No melena or hematochezia.    NEUROLOGICAL: No headaches, memory loss, loss of strength, numbness      PHYSICAL EXAM:  Vital Signs Last 24 Hrs  T(C): 36.8 (06 Jun 2021 21:41), Max: 36.8 (06 Jun 2021 17:29)  T(F): 98.2 (06 Jun 2021 21:41), Max: 98.3 (06 Jun 2021 17:29)  HR: 60 (07 Jun 2021 05:40) (58 - 61)  BP: 174/81 (07 Jun 2021 05:40) (129/58 - 174/81)  BP(mean): --  RR: 18 (07 Jun 2021 05:40) (16 - 18)  SpO2: 98% (07 Jun 2021 05:40) (97% - 100%)    GENERAL: NAD, well-groomed, well-developed  HEAD:  Atraumatic, Normocephalic  EYES: EOMI, PERRLA, conjunctiva and sclera clear  NECK: Supple, No JVD, Normal thyroid  NERVOUS SYSTEM:  Alert & Oriented X3, Good concentration;  and symmetric  CHEST/LUNG: Clear to auscultation bilaterally; No rales, rhonchi, wheezing, or rubs  HEART: S1S2 regular, with III/VI ATTILA Left base, LSB  without  rub nor gallop  ABDOMEN: Soft, Nontender, Nondistended; Bowel sounds present  EXTREMITIES:  2+ Peripheral Pulses, No clubbing, cyanosis, or edema      LABS:                        10.3   5.57  )-----------( 155      ( 07 Jun 2021 07:46 )             32.1     07 Jun 2021 07:46    136    |  100    |  30     ----------------------------<  90     4.1     |  24     |  1.38     Ca    8.8        07 Jun 2021 07:46  Phos  3.5       07 Jun 2021 07:46  Mg     1.9       07 Jun 2021 07:46        CAPILLARY BLOOD GLUCOSE          TELEMETRY: AV Paced       Assessment and Plan:   Problem/Plan - 1:  ·  Problem: Acute congestive heart failure, unspecified heart failure type.  Plan: H/o valvular disease.  Still waiting for echo.  Diurese  change to PO   Consider Entresto pending echo . Daily weights - down 4.9 kg   She ambulated to BR on RA without TORIBIO     Problem/Plan - 2:  ·  Problem: Hypertension, unspecified type.  Plan: added  Hydralizine 25 bid  for afterload reduction. Advance as needed.  Check f/u labs     Problem/Plan - 3:  ·  Problem: Mitral valve prolapse.  Plan: for f/u echocardiogram.     DVT prophylaxis         Care Discussed with Consultants/Other Providers:
Patient is a 89y old  Female who presents with a chief complaint of TORIBIO (07 Jun 2021 09:18)      INTERVAL HPI/OVERNIGHT EVENTS: none      MEDICATIONS  (STANDING):  ascorbic acid 500 milliGRAM(s) Oral daily  atorvastatin 20 milliGRAM(s) Oral at bedtime  atropine 1% Solution 1 Drop(s) Left EYE two times a day  cholecalciferol 1000 Unit(s) Oral daily  dorzolamide 2%/timolol 0.5% Ophthalmic Solution 1 Drop(s) Left EYE two times a day  enoxaparin Injectable 30 milliGRAM(s) SubCutaneous daily  furosemide    Tablet 40 milliGRAM(s) Oral daily  gabapentin 100 milliGRAM(s) Oral three times a day  hydrALAZINE 25 milliGRAM(s) Oral two times a day  latanoprost 0.005% Ophthalmic Solution 1 Drop(s) Both EYES at bedtime  losartan 100 milliGRAM(s) Oral daily  metoprolol succinate ER 25 milliGRAM(s) Oral daily  prednisoLONE acetate 1% Suspension 1 Drop(s) Left EYE daily  sertraline 150 milliGRAM(s) Oral at bedtime  vitamin E 400 International Unit(s) Oral daily    MEDICATIONS  (PRN):  acetaminophen   Tablet .. 650 milliGRAM(s) Oral every 6 hours PRN Moderate Pain (4 - 6)  ALPRAZolam 0.5 milliGRAM(s) Oral at bedtime PRN anxiety  diphenoxylate/atropine 1 Tablet(s) Oral every 6 hours PRN Diarrhea              Allergies    codeine (Other)  Motrin (Nausea)  Nitroglycerine-passed out (Other)  PCN (Anaphylaxis)  penicillins (Anaphylaxis)    Intolerances        REVIEW OF SYSTEMS:  CARDIOVASCULAR: No chest pain, palpitations, dizziness, or leg swelling; no shortness of breath     RESPIRATORY: No cough, wheezing, chills or hemoptysis; No shortness of breath    GASTROINTESTINAL: No abdominal or epigastric pain. No nausea, vomiting, or hematemesis; No diarrhea or constipation. No melena or hematochezia.    NEUROLOGICAL: No headaches, memory loss, loss of strength, numbness      PHYSICAL EXAM:  Vital Signs Last 24 Hrs  T(C): 36.4 (08 Jun 2021 04:33), Max: 36.9 (07 Jun 2021 12:15)  T(F): 97.6 (08 Jun 2021 04:33), Max: 98.5 (07 Jun 2021 12:15)  HR: 60 (08 Jun 2021 04:33) (60 - 65)  BP: 169/78 (08 Jun 2021 04:33) (126/70 - 169/78)  BP(mean): --  RR: 17 (08 Jun 2021 04:33) (17 - 18)  SpO2: 100% (08 Jun 2021 04:33) (97% - 100%)    GENERAL: NAD, well-groomed, well-developed  HEAD:  Atraumatic, Normocephalic  EYES: EOMI, PERRLA, conjunctiva and sclera clear  NECK: Supple, No JVD, Normal thyroid  NERVOUS SYSTEM:  Alert & Oriented X3, Good concentration;  and symmetric  CHEST/LUNG: Clear to auscultation bilaterally; No rales, rhonchi, wheezing, or rubs  HEART: S1S2 regular, with III/VI ATTILA left base, left sternal border, without , rub nor gallop  ABDOMEN: Soft, Nontender, Nondistended; Bowel sounds present  EXTREMITIES:  No clubbing, cyanosis, or edema      LABS:                        10.8   5.86  )-----------( 155      ( 08 Jun 2021 07:58 )             34.5     08 Jun 2021 07:58    136    |  98     |  40     ----------------------------<  101    3.5     |  24     |  1.53     Ca    9.0        08 Jun 2021 07:58  Phos  3.9       08 Jun 2021 07:58  Mg     2.0       08 Jun 2021 07:58        CAPILLARY BLOOD GLUCOSE          TELEMETRY: AV paced     < from: Transthoracic Echocardiogram (06.07.21 @ 11:11) >  CONCLUSIONS:  1. Mitral annular calcification, otherwise normal mitral  valve. Mild mitral regurgitation.  2. Aortic valve leaflet morphology not well visualized.  The valve is heavily calcified. Peak transaortic valve  gradient equals 26 mm Hg, mean transaortic valve gradient  equals 15 mm Hg.  Despite the transaortic gradients, the  gross appearance of the valve is consistent with  significant (possibly severe) aortic stenosis.  However,  unable to accurately estimate the aortic valve area.  Mild-moderate aortic regurgitation.  3. Severely dilated left atrium.  LA volume index = 68  cc/m2.  4. Moderate concentric left ventricular hypertrophy.  5. Endocardium not well visualized; grossly normal left  ventricular systolic function.  6. The right ventricle is not well visualized; grossly  normal right ventricular systolic function.  A device wire  is noted in the right heart.  7. Estimated right ventricular systolic pressure equals 41  mm Hg, assuming right atrial pressure equals 10 mm Hg,  consistent with mild pulmonary hypertension.    < end of copied text >      Assessment and Plan:   Problem/Plan - 1:  ·  Problem: Acute congestive heart failure, unspecified heart failure type.  Plan: H/o valvular disease.  See echo.   Will order STEPHANIE.  On PO Lasix    Consider Entresto pending echo . Daily weights - down 459 kg   She ambulated to BR on RA without TORIBIO     Problem/Plan - 2:  ·  Problem: Hypertension, unspecified type.  Plan: added  Hydralizine 25 bid  for afterload reduction. Advance to tid      Problem/Plan - 3:  ·  Problem:hypokalemia   to replenish     DVT prophylaxis         Care Discussed with Consultants/Other Providers: Dr Newton

## 2021-06-09 NOTE — DISCHARGE NOTE NURSING/CASE MANAGEMENT/SOCIAL WORK - PATIENT PORTAL LINK FT
You can access the FollowMyHealth Patient Portal offered by [facility] by registering at the following website: [email or web address]. By joining [employer] [facility]’s FollowMyHealth portal, you will also be able to view your health information using other applications (apps) compatible with our system.

## 2021-06-09 NOTE — DISCHARGE NOTE PROVIDER - NSDCMRMEDTOKEN_GEN_ALL_CORE_FT
acetaminophen 325 mg oral tablet: 3 tab(s) orally every 8 hours, As needed FOR Pain  aspirin 81 mg oral tablet: 1 tab(s) orally every other day  atropine 1% ophthalmic solution: 1 drop(s) in the left eye 2 times a day  Crestor 5 mg oral tablet: 1 tab(s) orally once a day (at bedtime)  dorzolamide-timolol 2%-0.5% preservative-free ophthalmic solution: 1 drop(s) in the left eye 2 times a day  Dramamine 50 mg oral tablet: 1 tab(s) orally once a day, As Needed  Radha-C 500 mg oral tablet: 1 tab(s) orally once a day  famotidine 40 mg oral tablet: 1 tab(s) orally once a day (at lunchtime)  gabapentin 100 mg oral capsule: 1 cap(s) orally 3 times a day  latanoprost 0.005% ophthalmic solution: 1 drop(s) to each affected eye once a day (in the evening)  Lomotil 2.5 mg-0.025 mg oral tablet: 2 tab(s) orally 4 times a day, As Needed  losartan-hydrochlorothiazide 100 mg-25 mg oral tablet: 1 tab(s) orally once a day  Lovenox 40 mg/0.4 mL injectable solution: 40 milligram(s) injectable once a day for 2 WEEKS, FOLLOWED BY ASPIRIN 325MG  metoprolol succinate 25 mg oral tablet, extended release: 1 tab(s) orally once a day  prednisoLONE acetate 1% ophthalmic suspension: 1 drop(s) in the left eye once a day  sacubitril-valsartan 24 mg-26 mg oral tablet: 1 tab(s) orally 2 times a day  **PRICE CHECK**   Vitamin D3 1000 intl units oral tablet: 1 tab(s) orally once a day  vitamin E 400 intl units oral capsule: 1 cap(s) orally once a day  Xanax 0.5 mg oral tablet: 1 tab(s) orally 2 times a day, As Needed  Zoloft: 150 milligram(s) orally once a day (at bedtime)   acetaminophen 325 mg oral tablet: 2 tab(s) orally every 6 hours, As needed, Moderate Pain (4 - 6)  aspirin 81 mg oral tablet: 1 tab(s) orally every other day  atorvastatin 20 mg oral tablet: 1 tab(s) orally once a day (at bedtime)  atropine 1% ophthalmic solution: 1 drop(s) in the left eye 2 times a day  dorzolamide-timolol 2%-0.5% preservative-free ophthalmic solution: 1 drop(s) in the left eye 2 times a day  Radha-C 500 mg oral tablet: 1 tab(s) orally once a day  famotidine 40 mg oral tablet: 1 tab(s) orally once a day (at lunchtime)  gabapentin 100 mg oral capsule: 1 cap(s) orally 3 times a day  latanoprost 0.005% ophthalmic solution: 1 drop(s) to each affected eye once a day (in the evening)  Lomotil 2.5 mg-0.025 mg oral tablet: 2 tab(s) orally 4 times a day, As Needed  losartan-hydrochlorothiazide 100 mg-25 mg oral tablet: 1 tab(s) orally once a day  metoprolol succinate 25 mg oral tablet, extended release: 1 tab(s) orally once a day  prednisoLONE acetate 1% ophthalmic suspension: 1 drop(s) in the left eye once a day  sacubitril-valsartan 24 mg-26 mg oral tablet: 1 tab(s) orally 2 times a day  **PRICE CHECK**   Vitamin D3 1000 intl units oral tablet: 1 tab(s) orally once a day  vitamin E 400 intl units oral capsule: 1 cap(s) orally once a day  Xanax 0.5 mg oral tablet: 1 tab(s) orally 2 times a day, As Needed  Zoloft: 150 milligram(s) orally once a day (at bedtime)   acetaminophen 325 mg oral tablet: 2 tab(s) orally every 6 hours, As needed, Moderate Pain (4 - 6)  aspirin 81 mg oral tablet: 1 tab(s) orally every other day  atorvastatin 20 mg oral tablet: 1 tab(s) orally once a day (at bedtime)  atropine 1% ophthalmic solution: 1 drop(s) in the left eye 2 times a day  dorzolamide-timolol 2%-0.5% preservative-free ophthalmic solution: 1 drop(s) in the left eye 2 times a day  Radha-C 500 mg oral tablet: 1 tab(s) orally once a day  famotidine 40 mg oral tablet: 1 tab(s) orally once a day (at lunchtime)  furosemide 20 mg oral tablet: 1 tab(s) orally once a day     gabapentin 100 mg oral capsule: 1 cap(s) orally 3 times a day  hydrALAZINE 25 mg oral tablet: 1 tab(s) orally 3 times a day   latanoprost 0.005% ophthalmic solution: 1 drop(s) to each affected eye once a day (in the evening)  Lomotil 2.5 mg-0.025 mg oral tablet: 2 tab(s) orally 4 times a day, As Needed  metoprolol succinate 25 mg oral tablet, extended release: 1 tab(s) orally once a day  prednisoLONE acetate 1% ophthalmic suspension: 1 drop(s) in the left eye once a day  sacubitril-valsartan 24 mg-26 mg oral tablet: 1 tab(s) orally 2 times a day  **PRICE CHECK**   Vitamin D3 1000 intl units oral tablet: 1 tab(s) orally once a day  vitamin E 400 intl units oral capsule: 1 cap(s) orally once a day  Xanax 0.5 mg oral tablet: 1 tab(s) orally 2 times a day, As Needed  Zoloft: 150 milligram(s) orally once a day (at bedtime)

## 2021-06-09 NOTE — DISCHARGE NOTE NURSING/CASE MANAGEMENT/SOCIAL WORK - NSSCTYPOFSERV_GEN_ALL_CORE
Visiting RN to see patient day after discharge and will call prior to visit. Physical therapy to follow another time and will call prior to visit.

## 2021-06-09 NOTE — DISCHARGE NOTE PROVIDER - CARE PROVIDER_API CALL
Portillo Roman)  Internal Medicine  [address] [facility], [place] [address]  Phone: [phone]  Fax: [phone]  Follow Up Time: 1 week   Portillo Roman)  Internal Medicine  [address] [place] [address], [place] [address]  Phone: [phone]  Fax: [phone]  Scheduled Appointment: 06/14/2021 01:30 PM

## 2021-06-09 NOTE — DISCHARGE NOTE PROVIDER - HOSPITAL COURSE
89F hx of hx of HTN, HLD, Mitral valve prolapse, PSVT , back pain  with pacemaker ([employer], last changed 2018), presents to the ED after discussion with Dr. Roman and Dr. Lopez for evaluation for new heart failure    89F hx of hx of HTN, HLD, Mitral valve prolapse, PSVT , back pain  with pacemaker ([employer], last changed 2018),   presents to the ED after discussion with Dr. Roman and Dr. Lopez for evaluation for new heart failure.     hospital course:       Acute congestive heart failure  STEPHANIE eval for aortic valve - done 6/9.   -H/o valvular disease.  See echo.   family wants conservative management.    Will follow on PO Lasix  and  Entresto Daily weights - down 4.6 kg   She ambulated to BR on RA without TORIBIO     Hypertension  - added  Hydralizine 25 TID for afterload reduction.  Entresto      hypokalemia   - to replenish   This pill may be the cause of heartburn.   Can give liquid, and d/c .    -Decrease Lasix to 20 89F hx of hx of HTN, HLD, Mitral valve prolapse, PSVT , back pain  with pacemaker ([employer], last changed 2018),   presents to the ED after discussion with Dr. Roman and Dr. Lopez for evaluation for new heart failure.     hospital course:       Acute congestive heart failure  STEPHANIE eval for aortic valve - done 6/9.   -H/o valvular disease.  See echo.   family wants conservative management.    Will follow on PO Lasix  and  Entresto Daily weights - down 4.6 kg   She ambulated to BR on RA without TORIBIO     Hypertension  - added  Hydralizine 25 TID for afterload reduction.    -Entresto   -Metoprolol ER 25mg QD     hypokalemia   - to replenish   This pill may be the cause of heartburn.   Can give liquid, and d/c .    -Decrease Lasix to 20

## 2021-06-17 ENCOUNTER — APPOINTMENT (OUTPATIENT)
Dept: CARE COORDINATION | Facility: HOME HEALTH | Age: 86
End: 2021-06-17
Payer: MEDICARE

## 2021-06-17 VITALS
DIASTOLIC BLOOD PRESSURE: 60 MMHG | OXYGEN SATURATION: 99 % | HEART RATE: 55 BPM | RESPIRATION RATE: 17 BRPM | TEMPERATURE: 97.8 F | SYSTOLIC BLOOD PRESSURE: 108 MMHG | WEIGHT: 124.4 LBS | BODY MASS INDEX: 24.3 KG/M2

## 2021-06-17 DIAGNOSIS — E78.5 HYPERLIPIDEMIA, UNSPECIFIED: ICD-10-CM

## 2021-06-17 DIAGNOSIS — K21.9 GASTRO-ESOPHAGEAL REFLUX DISEASE W/OUT ESOPHAGITIS: ICD-10-CM

## 2021-06-17 DIAGNOSIS — I50.9 HEART FAILURE, UNSPECIFIED: ICD-10-CM

## 2021-06-17 DIAGNOSIS — I10 ESSENTIAL (PRIMARY) HYPERTENSION: ICD-10-CM

## 2021-06-17 PROCEDURE — 99495 TRANSJ CARE MGMT MOD F2F 14D: CPT

## 2021-06-17 RX ORDER — SERTRALINE HYDROCHLORIDE 100 MG/1
100 TABLET, FILM COATED ORAL DAILY
Refills: 0 | Status: DISCONTINUED | COMMUNITY
Start: 2017-11-07 | End: 2021-06-17

## 2021-06-17 RX ORDER — FAMOTIDINE 40 MG/1
40 TABLET, FILM COATED ORAL DAILY
Refills: 0 | Status: ACTIVE | COMMUNITY
Start: 2021-06-17

## 2021-06-17 RX ORDER — ALPRAZOLAM 0.5 MG/1
0.5 TABLET ORAL TWICE DAILY
Refills: 0 | Status: ACTIVE | COMMUNITY
Start: 2017-12-19

## 2021-06-17 RX ORDER — DIPHENOXYLATE HYDROCHLORIDE AND ATROPINE SULFATE 2.5; .025 MG/1; MG/1
2.5-0.025 TABLET ORAL 4 TIMES DAILY
Refills: 0 | Status: ACTIVE | COMMUNITY
Start: 2021-06-17

## 2021-06-17 RX ORDER — DIPHENOXYLATE HYDROCHLORIDE AND ATROPINE SULFATE 2.5; .025 MG/1; MG/1
2.5-0.025 TABLET ORAL
Qty: 100 | Refills: 0 | Status: DISCONTINUED | COMMUNITY
Start: 2017-11-07 | End: 2021-06-17

## 2021-06-17 RX ORDER — MUPIROCIN 20 MG/G
2 OINTMENT TOPICAL
Qty: 22 | Refills: 0 | Status: DISCONTINUED | COMMUNITY
Start: 2017-11-13 | End: 2021-06-17

## 2021-06-17 RX ORDER — LOSARTAN POTASSIUM AND HYDROCHLOROTHIAZIDE 25; 100 MG/1; MG/1
100-25 TABLET ORAL DAILY
Refills: 0 | Status: DISCONTINUED | COMMUNITY
Start: 2017-09-12 | End: 2021-06-17

## 2021-06-17 RX ORDER — SACUBITRIL AND VALSARTAN 24; 26 MG/1; MG/1
24-26 TABLET, FILM COATED ORAL TWICE DAILY
Refills: 0 | Status: ACTIVE | COMMUNITY
Start: 2021-06-17

## 2021-06-17 NOTE — HISTORY OF PRESENT ILLNESS
[Formal Caregiver] : formal caregiver [FreeTextEntry1] : f/u hospitalization for CHF [de-identified] :  89F hx of hx of HTN, HLD, Mitral valve prolapse, PSVT , back pain with pacemaker ([employer], last changed 2018), \par presents to the ED after discussion with Dr. Roman and Dr. Lopez for evaluation for new heart failure. \par Paient d/c home with NW @ home\par CHF: denies SOB, however experiences mild TORIBIO, compliant with regimen, wt today 124.4 stable. Reports f/u with cardio yesterday in which bloodwork completed, was informed of "leaky valve" and will need further workup.  Reports f/u appt in 3 weeks (7/6) for PPM check and echo\par HTN/HLD: denies CP, sob, dizziness, lightheadedness, compliant with regimen\par GERD: reports mild reflux at times, denies n/v/d/c\par Patient compliant with COVID vacc series

## 2021-06-17 NOTE — PHYSICAL EXAM
[No Acute Distress] : no acute distress [No Respiratory Distress] : no respiratory distress  [No Accessory Muscle Use] : no accessory muscle use [Clear to Auscultation] : lungs were clear to auscultation bilaterally [Normal Rate] : normal rate  [Regular Rhythm] : with a regular rhythm [Pedal Pulses Present] : the pedal pulses are present [No Edema] : there was no peripheral edema [Soft] : abdomen soft [Non Tender] : non-tender [Non-distended] : non-distended [Normal Bowel Sounds] : normal bowel sounds [No Joint Swelling] : no joint swelling [Grossly Normal Strength/Tone] : grossly normal strength/tone [Normal] : no rash [Memory Grossly Normal] : memory grossly normal [Normal Affect] : the affect was normal [Alert and Oriented x3] : oriented to person, place, and time [Normal Mood] : the mood was normal [de-identified] : +murmur [de-identified] : utilizes RW for gait stability

## 2021-09-02 ENCOUNTER — APPOINTMENT (OUTPATIENT)
Dept: ELECTROPHYSIOLOGY | Facility: CLINIC | Age: 86
End: 2021-09-02
Payer: MEDICARE

## 2021-09-02 ENCOUNTER — NON-APPOINTMENT (OUTPATIENT)
Age: 86
End: 2021-09-02

## 2021-09-02 PROCEDURE — 93294 REM INTERROG EVL PM/LDLS PM: CPT

## 2021-09-02 PROCEDURE — 93296 REM INTERROG EVL PM/IDS: CPT

## 2021-12-02 ENCOUNTER — NON-APPOINTMENT (OUTPATIENT)
Age: 86
End: 2021-12-02

## 2021-12-02 ENCOUNTER — APPOINTMENT (OUTPATIENT)
Dept: ELECTROPHYSIOLOGY | Facility: CLINIC | Age: 86
End: 2021-12-02
Payer: MEDICARE

## 2021-12-02 PROCEDURE — 93296 REM INTERROG EVL PM/IDS: CPT | Mod: NC

## 2021-12-02 PROCEDURE — 93294 REM INTERROG EVL PM/LDLS PM: CPT

## 2022-01-02 ENCOUNTER — OUTPATIENT (OUTPATIENT)
Dept: OUTPATIENT SERVICES | Facility: HOSPITAL | Age: 87
LOS: 1 days | Discharge: HOME | End: 2022-01-02

## 2022-01-02 DIAGNOSIS — D64.9 ANEMIA, UNSPECIFIED: ICD-10-CM

## 2022-01-03 ENCOUNTER — NON-APPOINTMENT (OUTPATIENT)
Age: 87
End: 2022-01-03

## 2022-01-03 RX ORDER — FUROSEMIDE 20 MG/1
20 TABLET ORAL DAILY
Refills: 0 | Status: DISCONTINUED | COMMUNITY
Start: 2021-06-17 | End: 2022-01-03

## 2022-01-03 RX ORDER — SERTRALINE HYDROCHLORIDE 100 MG/1
100 TABLET, FILM COATED ORAL DAILY
Refills: 0 | Status: DISCONTINUED | COMMUNITY
Start: 2021-06-17 | End: 2022-01-03

## 2022-01-03 RX ORDER — MELATONIN 3 MG
25 MCG TABLET ORAL
Refills: 0 | Status: DISCONTINUED | COMMUNITY
Start: 2021-06-17 | End: 2022-01-03

## 2022-01-03 RX ORDER — HYDRALAZINE HYDROCHLORIDE 25 MG/1
25 TABLET ORAL 3 TIMES DAILY
Refills: 0 | Status: DISCONTINUED | COMMUNITY
Start: 2021-06-17 | End: 2022-01-03

## 2022-01-04 ENCOUNTER — APPOINTMENT (OUTPATIENT)
Dept: HEMATOLOGY ONCOLOGY | Facility: CLINIC | Age: 87
End: 2022-01-04
Payer: MEDICARE

## 2022-01-04 ENCOUNTER — APPOINTMENT (OUTPATIENT)
Dept: RADIATION ONCOLOGY | Facility: CLINIC | Age: 87
End: 2022-01-04
Payer: MEDICARE

## 2022-01-04 VITALS
OXYGEN SATURATION: 94 % | TEMPERATURE: 97.3 F | DIASTOLIC BLOOD PRESSURE: 73 MMHG | HEIGHT: 60 IN | BODY MASS INDEX: 24.24 KG/M2 | WEIGHT: 123.46 LBS | SYSTOLIC BLOOD PRESSURE: 148 MMHG | RESPIRATION RATE: 18 BRPM | HEART RATE: 87 BPM

## 2022-01-04 VITALS
SYSTOLIC BLOOD PRESSURE: 167 MMHG | OXYGEN SATURATION: 100 % | TEMPERATURE: 97.8 F | RESPIRATION RATE: 16 BRPM | HEART RATE: 60 BPM | WEIGHT: 124 LBS | BODY MASS INDEX: 24.35 KG/M2 | DIASTOLIC BLOOD PRESSURE: 81 MMHG | HEIGHT: 60 IN

## 2022-01-04 DIAGNOSIS — C44.219 BASAL CELL CARCINOMA OF SKIN OF LEFT EAR AND EXTERNAL AURICULAR CANAL: ICD-10-CM

## 2022-01-04 DIAGNOSIS — Z87.898 PERSONAL HISTORY OF OTHER SPECIFIED CONDITIONS: ICD-10-CM

## 2022-01-04 DIAGNOSIS — Z86.59 PERSONAL HISTORY OF OTHER MENTAL AND BEHAVIORAL DISORDERS: ICD-10-CM

## 2022-01-04 DIAGNOSIS — Z87.19 PERSONAL HISTORY OF OTHER DISEASES OF THE DIGESTIVE SYSTEM: ICD-10-CM

## 2022-01-04 DIAGNOSIS — Z80.8 FAMILY HISTORY OF MALIGNANT NEOPLASM OF OTHER ORGANS OR SYSTEMS: ICD-10-CM

## 2022-01-04 DIAGNOSIS — Z80.3 FAMILY HISTORY OF MALIGNANT NEOPLASM OF BREAST: ICD-10-CM

## 2022-01-04 DIAGNOSIS — Z87.81 PERSONAL HISTORY OF (HEALED) TRAUMATIC FRACTURE: ICD-10-CM

## 2022-01-04 DIAGNOSIS — Z80.7 FAMILY HISTORY OF OTHER MALIGNANT NEOPLASMS OF LYMPHOID, HEMATOPOIETIC AND RELATED TISSUES: ICD-10-CM

## 2022-01-04 DIAGNOSIS — Z80.0 FAMILY HISTORY OF MALIGNANT NEOPLASM OF DIGESTIVE ORGANS: ICD-10-CM

## 2022-01-04 DIAGNOSIS — Z86.69 PERSONAL HISTORY OF OTHER DISEASES OF THE NERVOUS SYSTEM AND SENSE ORGANS: ICD-10-CM

## 2022-01-04 DIAGNOSIS — Z83.71 FAMILY HISTORY OF COLONIC POLYPS: ICD-10-CM

## 2022-01-04 DIAGNOSIS — Z80.1 FAMILY HISTORY OF MALIGNANT NEOPLASM OF TRACHEA, BRONCHUS AND LUNG: ICD-10-CM

## 2022-01-04 DIAGNOSIS — R68.89 OTHER GENERAL SYMPTOMS AND SIGNS: ICD-10-CM

## 2022-01-04 DIAGNOSIS — Z80.42 FAMILY HISTORY OF MALIGNANT NEOPLASM OF PROSTATE: ICD-10-CM

## 2022-01-04 PROCEDURE — 99203 OFFICE O/P NEW LOW 30 MIN: CPT | Mod: 25

## 2022-01-04 PROCEDURE — 99204 OFFICE O/P NEW MOD 45 MIN: CPT

## 2022-01-04 RX ORDER — UBIDECARENONE/VIT E ACET 100MG-5
50 MCG CAPSULE ORAL
Refills: 0 | Status: ACTIVE | COMMUNITY

## 2022-01-04 RX ORDER — SERTRALINE HYDROCHLORIDE 150 MG/1
150 CAPSULE ORAL
Refills: 0 | Status: ACTIVE | COMMUNITY

## 2022-01-04 NOTE — PHYSICAL EXAM
[] : no respiratory distress [Cervical Lymph Nodes Enlarged Posterior Bilaterally] : posterior cervical [de-identified] : surgical defect left ear with open but clean appearing wound in retroauricualr area on right measuring approximately 2 cm

## 2022-01-04 NOTE — CONSULT LETTER
[Dear  ___] : Dear  [unfilled], [Consult Letter:] : I had the pleasure of evaluating your patient, [unfilled]. [Consult Closing:] : Thank you very much for allowing me to participate in the care of this patient.  If you have any questions, please do not hesitate to contact me. [Sincerely,] : Sincerely, [DrCarolina  ___] : Dr. ESCAMILLA [Please see my note below.] : Please see my note below. [DrCarolina ___] : Dr. ESCAMILLA [FreeTextEntry3] : DONALDO OAKES MD

## 2022-01-04 NOTE — REVIEW OF SYSTEMS
[Patient Intake Form Reviewed] : Patient intake form was reviewed [Nocturia] : nocturia [Dizziness] : dizziness [Fainting] : fainting [Anxiety] : anxiety [Easy Bruising] : a tendency for easy bruising [Muscle Pain] : muscle pain [Muscle Weakness] : muscle weakness [Anxiety: Grade 1 - Mild symptoms; intervention not indicated] : Anxiety: Grade 1 - Mild symptoms; intervention not indicated [Suicidal] : not suicidal [FreeTextEntry3] : macular degeneration/glaucoma [FreeTextEntry4] : hard of hearing [FreeTextEntry5] : permanent pacemaker [FreeTextEntry8] : 3x per night [FreeTextEntry9] : uses rollator [de-identified] : history of vertigo/fainting spells

## 2022-01-04 NOTE — HISTORY OF PRESENT ILLNESS
[de-identified] : Mrs. Pop is a 89 year old female with past medical history of  HTN, HLD, Mitral valve prolapse, PSVT , back pain with pacemaker ([employer], last changed 2018) presenting to the office for an initial consultation of BCC.\par \par This patient has BCC of the left post-auricular region last year.\par She walked out of the Mohs half way through the procedure.\par Does not want further surgery.\par \par She now has an ulcerated lesion and ? nodes.\par Patient might benefit from vismodegib or radiation.\par \par Dr. Martinez saw patient today, and does offer radiation, and would like to do a CT scan of the neck. \par \par \par \par \par  [de-identified] : Radiation oncology: Libby Martinez\par PCP Damion Lopez [phone]\par OP Claus Bo [phone]\par Cardio Portillo Roman [phone]\par Derm Missy Ramirez \par Dermatology: Dr. Terra Pollock\par \par Son: Samuel [phone]\par \par

## 2022-01-04 NOTE — PHYSICAL EXAM
[Capable of only limited self care, confined to bed or chair more than 50% of waking hours] : Status 3- Capable of only limited self care, confined to bed or chair more than 50% of waking hours [Normal] : pharynx is unremarkable, moist mucus membrane, no oral lesions [de-identified] : 2 cm ulcerated deep lesion with elevated boaders c/w BCC. There is increased bleeding, that is relatively mild.

## 2022-01-04 NOTE — VITALS
[Maximal Pain Intensity: 5/10] : 5/10 [Least Pain Intensity: 0/10] : 0/10 [10 - Extreme Distress] : Distress Level: 10 [Referred Patient  to social work for follow-up] : Patient was referred to social work for follow-up [Pain Location: ___] : Pain Location: [unfilled] [60: Requires occasional assistance, but is able to care for most of his/her needs] : 60: Requires occasional assistance, but is able to care for most of his/her needs

## 2022-01-04 NOTE — HISTORY OF PRESENT ILLNESS
[FreeTextEntry1] : Ms. 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 She had BCC of the left ear (superior posterior helix) s/p incomplete Mohs procedure (7/29/20). She reports that she had foolw up since then. Patient states she noticed blood on her pillow starting approximately 8 weeks ago in addition to left ear pain and bleeding. Approximately one month ago she also noted itchiness and scabbing to her scalp which led her to visit Dr. Ramirez, who then referred her to Dr. Pollock for evaluation. Dr. Pollock recommended consultation for immunotherapy with Dr. Shaw who has asked for consultation for. radiation therapy.  She does not want further surgery. \par \par Of note, patient has history of HTN, mitral valve prolapse and permanent pacemaker, with recent hospitalization for CHF.\par \par She presents today for discussion of RT to the left ear. \par \par

## 2022-01-04 NOTE — DISEASE MANAGEMENT
[Clinical] : TNM Stage: c [FreeTextEntry4] : basal cell [TTNM] : x [NTNM] : x [MTNM] : x [N/A] : Currently not applicable

## 2022-01-04 NOTE — OB/GYN HISTORY
[unknown] : the patient is unsure of the date of her LMP [Currently In Menopause] : currently in menopause [___] : Total Pregnancies: [unfilled]

## 2022-01-06 ENCOUNTER — RESULT REVIEW (OUTPATIENT)
Age: 87
End: 2022-01-06

## 2022-03-04 ENCOUNTER — APPOINTMENT (OUTPATIENT)
Dept: ELECTROPHYSIOLOGY | Facility: CLINIC | Age: 87
End: 2022-03-04

## 2022-03-10 ENCOUNTER — FORM ENCOUNTER (OUTPATIENT)
Age: 87
End: 2022-03-10

## 2022-04-07 ENCOUNTER — APPOINTMENT (OUTPATIENT)
Dept: ELECTROPHYSIOLOGY | Facility: CLINIC | Age: 87
End: 2022-04-07
Payer: MEDICARE

## 2022-04-07 ENCOUNTER — NON-APPOINTMENT (OUTPATIENT)
Age: 87
End: 2022-04-07

## 2022-04-07 PROCEDURE — 93294 REM INTERROG EVL PM/LDLS PM: CPT

## 2022-04-07 PROCEDURE — 93296 REM INTERROG EVL PM/IDS: CPT

## 2022-05-01 ENCOUNTER — INPATIENT (INPATIENT)
Facility: HOSPITAL | Age: 87
LOS: 3 days | Discharge: NURSING, REHAB OR LONG-TERM CARE | End: 2022-05-05
Attending: INTERNAL MEDICINE | Admitting: INTERNAL MEDICINE
Payer: MEDICARE

## 2022-05-01 VITALS
OXYGEN SATURATION: 99 % | HEART RATE: 65 BPM | SYSTOLIC BLOOD PRESSURE: 190 MMHG | DIASTOLIC BLOOD PRESSURE: 109 MMHG | RESPIRATION RATE: 18 BRPM | TEMPERATURE: 98 F | HEIGHT: 60 IN

## 2022-05-01 DIAGNOSIS — R06.02 SHORTNESS OF BREATH: ICD-10-CM

## 2022-05-01 LAB
ALBUMIN SERPL ELPH-MCNC: 3.6 G/DL — SIGNIFICANT CHANGE UP (ref 3.3–5)
ALP SERPL-CCNC: 94 U/L — SIGNIFICANT CHANGE UP (ref 40–120)
ALT FLD-CCNC: 24 U/L — SIGNIFICANT CHANGE UP (ref 4–33)
ANION GAP SERPL CALC-SCNC: 13 MMOL/L — SIGNIFICANT CHANGE UP (ref 7–14)
AST SERPL-CCNC: 33 U/L — HIGH (ref 4–32)
B PERT DNA SPEC QL NAA+PROBE: SIGNIFICANT CHANGE UP
B PERT+PARAPERT DNA PNL SPEC NAA+PROBE: SIGNIFICANT CHANGE UP
BASE EXCESS BLDV CALC-SCNC: -2.3 MMOL/L — LOW (ref -2–3)
BASOPHILS # BLD AUTO: 0.01 K/UL — SIGNIFICANT CHANGE UP (ref 0–0.2)
BASOPHILS NFR BLD AUTO: 0.2 % — SIGNIFICANT CHANGE UP (ref 0–2)
BILIRUB SERPL-MCNC: 0.6 MG/DL — SIGNIFICANT CHANGE UP (ref 0.2–1.2)
BLOOD GAS VENOUS COMPREHENSIVE RESULT: SIGNIFICANT CHANGE UP
BORDETELLA PARAPERTUSSIS (RAPRVP): SIGNIFICANT CHANGE UP
BUN SERPL-MCNC: 40 MG/DL — HIGH (ref 7–23)
C PNEUM DNA SPEC QL NAA+PROBE: SIGNIFICANT CHANGE UP
CALCIUM SERPL-MCNC: 8.6 MG/DL — SIGNIFICANT CHANGE UP (ref 8.4–10.5)
CHLORIDE BLDV-SCNC: 100 MMOL/L — SIGNIFICANT CHANGE UP (ref 96–108)
CHLORIDE SERPL-SCNC: 98 MMOL/L — SIGNIFICANT CHANGE UP (ref 98–107)
CO2 BLDV-SCNC: 23.7 MMOL/L — SIGNIFICANT CHANGE UP (ref 22–26)
CO2 SERPL-SCNC: 22 MMOL/L — SIGNIFICANT CHANGE UP (ref 22–31)
CREAT SERPL-MCNC: 1.07 MG/DL — SIGNIFICANT CHANGE UP (ref 0.5–1.3)
EGFR: 47 ML/MIN/1.73M2 — LOW
EOSINOPHIL # BLD AUTO: 0 K/UL — SIGNIFICANT CHANGE UP (ref 0–0.5)
EOSINOPHIL NFR BLD AUTO: 0 % — SIGNIFICANT CHANGE UP (ref 0–6)
FLUAV H1 2009 PAND RNA SPEC QL NAA+PROBE: DETECTED
FLUBV RNA SPEC QL NAA+PROBE: SIGNIFICANT CHANGE UP
GAS PNL BLDV: 132 MMOL/L — LOW (ref 136–145)
GLUCOSE BLDV-MCNC: 110 MG/DL — HIGH (ref 70–99)
GLUCOSE SERPL-MCNC: 121 MG/DL — HIGH (ref 70–99)
HADV DNA SPEC QL NAA+PROBE: SIGNIFICANT CHANGE UP
HCO3 BLDV-SCNC: 22 MMOL/L — SIGNIFICANT CHANGE UP (ref 22–29)
HCOV 229E RNA SPEC QL NAA+PROBE: SIGNIFICANT CHANGE UP
HCOV HKU1 RNA SPEC QL NAA+PROBE: SIGNIFICANT CHANGE UP
HCOV NL63 RNA SPEC QL NAA+PROBE: SIGNIFICANT CHANGE UP
HCOV OC43 RNA SPEC QL NAA+PROBE: SIGNIFICANT CHANGE UP
HCT VFR BLD CALC: 32.4 % — LOW (ref 34.5–45)
HCT VFR BLDA CALC: 32 % — LOW (ref 34.5–46.5)
HGB BLD CALC-MCNC: 10.5 G/DL — LOW (ref 11.5–15.5)
HGB BLD-MCNC: 10.4 G/DL — LOW (ref 11.5–15.5)
HMPV RNA SPEC QL NAA+PROBE: SIGNIFICANT CHANGE UP
HPIV1 RNA SPEC QL NAA+PROBE: SIGNIFICANT CHANGE UP
HPIV2 RNA SPEC QL NAA+PROBE: SIGNIFICANT CHANGE UP
HPIV3 RNA SPEC QL NAA+PROBE: SIGNIFICANT CHANGE UP
HPIV4 RNA SPEC QL NAA+PROBE: SIGNIFICANT CHANGE UP
IANC: 4.41 K/UL — SIGNIFICANT CHANGE UP (ref 1.8–7.4)
IMM GRANULOCYTES NFR BLD AUTO: 0.7 % — SIGNIFICANT CHANGE UP (ref 0–1.5)
LACTATE BLDV-MCNC: 1.2 MMOL/L — SIGNIFICANT CHANGE UP (ref 0.5–2)
LYMPHOCYTES # BLD AUTO: 0.8 K/UL — LOW (ref 1–3.3)
LYMPHOCYTES # BLD AUTO: 14.3 % — SIGNIFICANT CHANGE UP (ref 13–44)
M PNEUMO DNA SPEC QL NAA+PROBE: SIGNIFICANT CHANGE UP
MCHC RBC-ENTMCNC: 27.2 PG — SIGNIFICANT CHANGE UP (ref 27–34)
MCHC RBC-ENTMCNC: 32.1 GM/DL — SIGNIFICANT CHANGE UP (ref 32–36)
MCV RBC AUTO: 84.8 FL — SIGNIFICANT CHANGE UP (ref 80–100)
MONOCYTES # BLD AUTO: 0.34 K/UL — SIGNIFICANT CHANGE UP (ref 0–0.9)
MONOCYTES NFR BLD AUTO: 6.1 % — SIGNIFICANT CHANGE UP (ref 2–14)
NEUTROPHILS # BLD AUTO: 4.41 K/UL — SIGNIFICANT CHANGE UP (ref 1.8–7.4)
NEUTROPHILS NFR BLD AUTO: 78.7 % — HIGH (ref 43–77)
NRBC # BLD: 0 /100 WBCS — SIGNIFICANT CHANGE UP
NRBC # FLD: 0 K/UL — SIGNIFICANT CHANGE UP
PCO2 BLDV: 38 MMHG — LOW (ref 39–42)
PH BLDV: 7.38 — SIGNIFICANT CHANGE UP (ref 7.32–7.43)
PLATELET # BLD AUTO: 156 K/UL — SIGNIFICANT CHANGE UP (ref 150–400)
PO2 BLDV: 36 MMHG — SIGNIFICANT CHANGE UP
POTASSIUM BLDV-SCNC: 3.4 MMOL/L — LOW (ref 3.5–5.1)
POTASSIUM SERPL-MCNC: 3.5 MMOL/L — SIGNIFICANT CHANGE UP (ref 3.5–5.3)
POTASSIUM SERPL-SCNC: 3.5 MMOL/L — SIGNIFICANT CHANGE UP (ref 3.5–5.3)
PROT SERPL-MCNC: 6.5 G/DL — SIGNIFICANT CHANGE UP (ref 6–8.3)
RAPID RVP RESULT: DETECTED
RBC # BLD: 3.82 M/UL — SIGNIFICANT CHANGE UP (ref 3.8–5.2)
RBC # FLD: 14.8 % — HIGH (ref 10.3–14.5)
RSV RNA SPEC QL NAA+PROBE: SIGNIFICANT CHANGE UP
RV+EV RNA SPEC QL NAA+PROBE: SIGNIFICANT CHANGE UP
SAO2 % BLDV: 63.3 % — SIGNIFICANT CHANGE UP
SARS-COV-2 RNA SPEC QL NAA+PROBE: SIGNIFICANT CHANGE UP
SODIUM SERPL-SCNC: 133 MMOL/L — LOW (ref 135–145)
TROPONIN T, HIGH SENSITIVITY RESULT: 58 NG/L — CRITICAL HIGH
TROPONIN T, HIGH SENSITIVITY RESULT: 62 NG/L — CRITICAL HIGH
WBC # BLD: 5.6 K/UL — SIGNIFICANT CHANGE UP (ref 3.8–10.5)
WBC # FLD AUTO: 5.6 K/UL — SIGNIFICANT CHANGE UP (ref 3.8–10.5)

## 2022-05-01 PROCEDURE — 71045 X-RAY EXAM CHEST 1 VIEW: CPT | Mod: 26

## 2022-05-01 PROCEDURE — 99285 EMERGENCY DEPT VISIT HI MDM: CPT | Mod: CS,25,GC

## 2022-05-01 PROCEDURE — 93010 ELECTROCARDIOGRAM REPORT: CPT

## 2022-05-01 RX ORDER — IPRATROPIUM/ALBUTEROL SULFATE 18-103MCG
3 AEROSOL WITH ADAPTER (GRAM) INHALATION
Refills: 0 | Status: DISCONTINUED | OUTPATIENT
Start: 2022-05-01 | End: 2022-05-02

## 2022-05-01 RX ORDER — IPRATROPIUM/ALBUTEROL SULFATE 18-103MCG
3 AEROSOL WITH ADAPTER (GRAM) INHALATION ONCE
Refills: 0 | Status: COMPLETED | OUTPATIENT
Start: 2022-05-01 | End: 2022-05-01

## 2022-05-01 RX ADMIN — Medication 3 MILLILITER(S): at 21:53

## 2022-05-01 RX ADMIN — Medication 3 MILLILITER(S): at 21:36

## 2022-05-01 RX ADMIN — Medication 3 MILLILITER(S): at 21:19

## 2022-05-01 NOTE — ED PROVIDER NOTE - ADMIT DISPOSITION PRESENT ON ADMISSION SEPSIS
Date/Time of Note


Date/Time of Note


DATE: 12/28/18 


TIME: 15:52





Discharge Summary


Admission/Discharge Info


Admit Date/Time


Dec 23, 2018 at 19:55


Discharge Date/Time





Discharge Diagnosis


Gall bladder cancer


Patient Condition:  Stable


Hospital Course


65 yo male with CAD, DMII who presents with painless jaundice and obstructing 


mass


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 An ERCP was performed and 


stents were placed to bilateral hepatic ducts.  Bilirubins remained elevated 


post procedure but there was not further procedure to be done.  Biopsy taken 


which was notable for atypical cells but without definitive diagnosis.  Has 


[place] tumor by imaging.  He was seen by oncology who recommended outpatient 


initiation of chemotherapy and suggested a PET-CT.  CT chest was negative for 


thoracic metastasis. 





Patient had hyperglycemia and was managed with insulin.  Discharged on lantus 30


and 10 [facility]


Home Meds


Reported Medications


Linagliptin (TRADJENTA) 5 Mg Tablet, 5 MG PO DAILY, TAB


   12/23/18


Simvastatin (Simvastatin) 40 Mg Tablet, 40 MG PO DAILY, #30 TAB


   12/23/18


Benazepril Hcl* (Benazepril Hcl*) 10 Mg Tablet, 10 MG PO DAILY, #30 TAB


   12/23/18


Glimepiride* (Amaryl*) 4 Mg Tablet, 4 MG PO DAILY, TAB


   12/23/18


Metformin Hcl* (Metformin Hcl*) 850 Mg Tablet, 850 MG PO BID, #30 TAB


   12/23/18


Linagliptin (TRADJENTA) 5 Mg Tablet, 5 MG PO DAILY, TAB


   12/23/18


[Unknown]   No Conflict Check


   2/15/10


Discontinued Reported Medications


Aspirin* (Aspirin* EC) 81 Mg Tablet.dr, 81 MG PO DAILY, TAB


   5/23/16


Ramipril (Ramipril) 5 Mg Capsule, 5 MG PO DAILY, CAP


   5/23/16


Glyburide, Micro/Metformin Hcl (Glyburide-Metformin) 2.5-500 Tab, 1 TAB PO 


DAILY, TAB


   5/23/16


Metoprolol Tartrate* (Lopressor*) 25 Mg Tab, 25 MG PO DAILY, #60 TAB


   5/23/16


Discontinued Scripts


Acetaminophen* (Tylophen*) 500 Mg Capsule, 1 CAP PO Q6H PRN for PAIN AND OR 


ELEVATED TEMP, #30 CAP


   Prov:SIRIA BROWN PA-C         8/9/16


Acetaminophen-Codeine* (Acetaminophen-Cod #3*) 300-30 Mg Tab, 1 TAB PO Q4H PRN 


for PAIN, #10 TAB


   Prov:WILBERTO ANTHONY MD         8/1/16


Erythromycin (Erythromycin Opth) 3.5 Gm Oint..gm., 1 APPLIC LEFT EYE QID for 7 


Days, EA


   Prov:WILBERTO ANTHONY MD         8/1/16


Follow-up Plan


Make an appointment to see your oncologist Dr Kam next week


Start taking insulin as prescribed.  Stop taking your previous medications


It is important to see your primary doctor to manage your diabetes


Primary Care Provider


Not On Staff Doctor


Pending Labs





Laboratory Tests


Test
             12/27/18
17:02  12/27/18
20:15  12/28/18
07:41  12/28/18
08:52


Bedside                      222             223             144             198


Glucose
          mg/dL
()  mg/dL
()


Test
             12/28/18
12:41  
               
               



Bedside                      213  
               
               



Glucose
          mg/dL
()














REJI EVANS MD          Dec 28, 2018 15:55
No

## 2022-05-01 NOTE — ED ADULT NURSE NOTE - INTERVENTIONS DEFINITIONS
Call bell, personal items and telephone within reach/Instruct patient to call for assistance/Non-slip footwear when patient is off stretcher/Stretcher in lowest position, wheels locked, appropriate side rails in place/Monitor gait and stability

## 2022-05-01 NOTE — ED PROVIDER NOTE - OBJECTIVE STATEMENT
89 y/o female w/ PMH HTN, HLD, bronchitis receiving current tx c/o 89 y/o female w/ PMH HTN, HLD, Mitral valve prolapse, PSVT , back pain  with pacemaker ([employer], last changed 2018), bronchitis receiving doxycycline 100mg BID prescribed Tuesday for SOB and cough c/o 8 day history of worsening cough w/ sputum production, SOB, and lightheadedness w/ movement/exertion. Pt also c/o chest tightness s/p eating. Denies fevers, chills, nausea, vomiting, abdominal pain, dysuria, hematuria.

## 2022-05-01 NOTE — ED ADULT NURSE REASSESSMENT NOTE - NS ED NURSE REASSESS COMMENT FT1
Pt at baseline mental status, resting in stretcher. Pt changed and repositioned for comfort, nonblanchable redness noted to sacrum. Warm blankets provided for comfort. Safety maintained

## 2022-05-01 NOTE — ED PROVIDER NOTE - CLINICAL SUMMARY MEDICAL DECISION MAKING FREE TEXT BOX
89 y/o female w/ PMH HTN, HLD, Mitral valve prolapse, PSVT , back pain  with pacemaker ([employer], last changed 2018), bronchitis receiving doxycycline 100mg BID prescribed Tuesday for SOB and cough c/o 8 day history of worsening cough w/ sputum production, SOB, and lightheadedness w/ movement/exertion. Unremarkable neuro exam. No falls/head trauma. Will perform near-syncope workup, likely heartburn from food, screen for ACS vs COPD exacerbation. Likely adm for abx treatment and social work.

## 2022-05-01 NOTE — ED ADULT TRIAGE NOTE - CHIEF COMPLAINT QUOTE
pt com ing from home. pt c/o increased SOB x a few days.  pt is currently being treated for bronchitis.  pt has distended abd

## 2022-05-01 NOTE — ED PROVIDER NOTE - ATTENDING CONTRIBUTION TO CARE
Attending Statement: I have personally seen and examined this patient. I have fully participated in the care of this patient. I have reviewed all pertinent clinical information, including history physical exam, plan and the Resident's note and agree except as noted  89yoF PMH HTN, HLD, Mitral valve prolapse, PSVT , back pain  with pacemaker ([employer], last changed 2018) from home co SOB, cough and chest pain. Son w pt, gives hx. States she started to feel SOB, +cough a week ago. Saw pmd started on doxycycline, on day 5 w no improvement. Today felt more SOB, endorse exertional chest pain. +midsternal cp, non radiating, not pleuritic. +overall weak. no vomiting/diarrhea. no abdominal pain. no travel. nonsmoker.   Vital signs noted. mmm. elderly female. mild ronchi at bases no wheezing. no retractions. soft nontender abdomen. no  rebound. no guarding. no sign of trauma. no CVAT   plan labs, ekg, cxr, rvp/covid, Abx. tele monitor, tba

## 2022-05-01 NOTE — ED PROVIDER NOTE - NS ED ROS FT
CONSTITUTIONAL: No fever, weight loss, or fatigue  EYES: No eye pain, visual disturbances, or discharge  ENMT:  No difficulty hearing, tinnitus, vertigo; No sinus or throat pain  NECK: No pain or stiffness  RESPIRATORY: + cough, no wheezing, chills or hemoptysis; + shortness of breath  CARDIOVASCULAR: + chest pain, no palpitations, +dizziness, no leg swelling  GASTROINTESTINAL: No abdominal or epigastric pain. No nausea, vomiting, or hematemesis; No diarrhea or constipation. No melena or hematochezia.  GENITOURINARY: No dysuria, frequency, hematuria, or incontinence  NEUROLOGICAL: No headaches, memory loss, loss of strength, numbness, or tremors  SKIN: No itching, burning, rashes, or lesions

## 2022-05-02 DIAGNOSIS — I47.2 VENTRICULAR TACHYCARDIA: ICD-10-CM

## 2022-05-02 DIAGNOSIS — H40.9 UNSPECIFIED GLAUCOMA: ICD-10-CM

## 2022-05-02 DIAGNOSIS — I48.20 CHRONIC ATRIAL FIBRILLATION, UNSPECIFIED: ICD-10-CM

## 2022-05-02 DIAGNOSIS — J45.909 UNSPECIFIED ASTHMA, UNCOMPLICATED: ICD-10-CM

## 2022-05-02 DIAGNOSIS — J10.00 INFLUENZA DUE TO OTHER IDENTIFIED INFLUENZA VIRUS WITH UNSPECIFIED TYPE OF PNEUMONIA: ICD-10-CM

## 2022-05-02 DIAGNOSIS — I49.9 CARDIAC ARRHYTHMIA, UNSPECIFIED: ICD-10-CM

## 2022-05-02 DIAGNOSIS — R63.8 OTHER SYMPTOMS AND SIGNS CONCERNING FOOD AND FLUID INTAKE: ICD-10-CM

## 2022-05-02 DIAGNOSIS — I10 ESSENTIAL (PRIMARY) HYPERTENSION: ICD-10-CM

## 2022-05-02 DIAGNOSIS — K58.9 IRRITABLE BOWEL SYNDROME WITHOUT DIARRHEA: ICD-10-CM

## 2022-05-02 DIAGNOSIS — E78.5 HYPERLIPIDEMIA, UNSPECIFIED: ICD-10-CM

## 2022-05-02 LAB
ANION GAP SERPL CALC-SCNC: 15 MMOL/L — HIGH (ref 7–14)
BASOPHILS # BLD AUTO: 0 K/UL — SIGNIFICANT CHANGE UP (ref 0–0.2)
BASOPHILS NFR BLD AUTO: 0 % — SIGNIFICANT CHANGE UP (ref 0–2)
BUN SERPL-MCNC: 42 MG/DL — HIGH (ref 7–23)
CALCIUM SERPL-MCNC: 8.6 MG/DL — SIGNIFICANT CHANGE UP (ref 8.4–10.5)
CHLORIDE SERPL-SCNC: 97 MMOL/L — LOW (ref 98–107)
CO2 SERPL-SCNC: 21 MMOL/L — LOW (ref 22–31)
CREAT SERPL-MCNC: 1.1 MG/DL — SIGNIFICANT CHANGE UP (ref 0.5–1.3)
D DIMER BLD IA.RAPID-MCNC: 903 NG/ML DDU — HIGH
EGFR: 46 ML/MIN/1.73M2 — LOW
EOSINOPHIL # BLD AUTO: 0 K/UL — SIGNIFICANT CHANGE UP (ref 0–0.5)
EOSINOPHIL NFR BLD AUTO: 0 % — SIGNIFICANT CHANGE UP (ref 0–6)
GLUCOSE SERPL-MCNC: 128 MG/DL — HIGH (ref 70–99)
HCT VFR BLD CALC: 30.4 % — LOW (ref 34.5–45)
HGB BLD-MCNC: 9.7 G/DL — LOW (ref 11.5–15.5)
IANC: 5.02 K/UL — SIGNIFICANT CHANGE UP (ref 1.8–7.4)
IMM GRANULOCYTES NFR BLD AUTO: 0.5 % — SIGNIFICANT CHANGE UP (ref 0–1.5)
LYMPHOCYTES # BLD AUTO: 0.52 K/UL — LOW (ref 1–3.3)
LYMPHOCYTES # BLD AUTO: 9.1 % — LOW (ref 13–44)
MAGNESIUM SERPL-MCNC: 1.6 MG/DL — SIGNIFICANT CHANGE UP (ref 1.6–2.6)
MCHC RBC-ENTMCNC: 27.2 PG — SIGNIFICANT CHANGE UP (ref 27–34)
MCHC RBC-ENTMCNC: 31.9 GM/DL — LOW (ref 32–36)
MCV RBC AUTO: 85.4 FL — SIGNIFICANT CHANGE UP (ref 80–100)
MONOCYTES # BLD AUTO: 0.17 K/UL — SIGNIFICANT CHANGE UP (ref 0–0.9)
MONOCYTES NFR BLD AUTO: 3 % — SIGNIFICANT CHANGE UP (ref 2–14)
NEUTROPHILS # BLD AUTO: 5.02 K/UL — SIGNIFICANT CHANGE UP (ref 1.8–7.4)
NEUTROPHILS NFR BLD AUTO: 87.4 % — HIGH (ref 43–77)
NRBC # BLD: 0 /100 WBCS — SIGNIFICANT CHANGE UP
NRBC # FLD: 0 K/UL — SIGNIFICANT CHANGE UP
PHOSPHATE SERPL-MCNC: 3.7 MG/DL — SIGNIFICANT CHANGE UP (ref 2.5–4.5)
PLATELET # BLD AUTO: 123 K/UL — LOW (ref 150–400)
POTASSIUM SERPL-MCNC: 3.4 MMOL/L — LOW (ref 3.5–5.3)
POTASSIUM SERPL-SCNC: 3.4 MMOL/L — LOW (ref 3.5–5.3)
PROCALCITONIN SERPL-MCNC: 0.1 NG/ML — SIGNIFICANT CHANGE UP (ref 0.02–0.1)
RBC # BLD: 3.56 M/UL — LOW (ref 3.8–5.2)
RBC # FLD: 14.7 % — HIGH (ref 10.3–14.5)
SODIUM SERPL-SCNC: 133 MMOL/L — LOW (ref 135–145)
TSH SERPL-MCNC: 1.92 UIU/ML — SIGNIFICANT CHANGE UP (ref 0.27–4.2)
WBC # BLD: 5.74 K/UL — SIGNIFICANT CHANGE UP (ref 3.8–10.5)
WBC # FLD AUTO: 5.74 K/UL — SIGNIFICANT CHANGE UP (ref 3.8–10.5)

## 2022-05-02 PROCEDURE — 99223 1ST HOSP IP/OBS HIGH 75: CPT

## 2022-05-02 PROCEDURE — 99497 ADVNCD CARE PLAN 30 MIN: CPT | Mod: 25

## 2022-05-02 PROCEDURE — 71250 CT THORAX DX C-: CPT | Mod: 26

## 2022-05-02 RX ORDER — HEPARIN SODIUM 5000 [USP'U]/ML
5000 INJECTION INTRAVENOUS; SUBCUTANEOUS EVERY 12 HOURS
Refills: 0 | Status: DISCONTINUED | OUTPATIENT
Start: 2022-05-02 | End: 2022-05-05

## 2022-05-02 RX ORDER — FUROSEMIDE 40 MG
1 TABLET ORAL
Qty: 0 | Refills: 0 | DISCHARGE

## 2022-05-02 RX ORDER — IPRATROPIUM/ALBUTEROL SULFATE 18-103MCG
3 AEROSOL WITH ADAPTER (GRAM) INHALATION EVERY 6 HOURS
Refills: 0 | Status: DISCONTINUED | OUTPATIENT
Start: 2022-05-02 | End: 2022-05-05

## 2022-05-02 RX ORDER — METOCLOPRAMIDE HCL 10 MG
10 TABLET ORAL ONCE
Refills: 0 | Status: COMPLETED | OUTPATIENT
Start: 2022-05-02 | End: 2022-05-02

## 2022-05-02 RX ORDER — ACETAMINOPHEN 500 MG
650 TABLET ORAL EVERY 6 HOURS
Refills: 0 | Status: DISCONTINUED | OUTPATIENT
Start: 2022-05-02 | End: 2022-05-05

## 2022-05-02 RX ORDER — FAMOTIDINE 10 MG/ML
40 INJECTION INTRAVENOUS DAILY
Refills: 0 | Status: DISCONTINUED | OUTPATIENT
Start: 2022-05-02 | End: 2022-05-05

## 2022-05-02 RX ORDER — DORZOLAMIDE HYDROCHLORIDE TIMOLOL MALEATE 20; 5 MG/ML; MG/ML
1 SOLUTION/ DROPS OPHTHALMIC
Qty: 0 | Refills: 0 | DISCHARGE

## 2022-05-02 RX ORDER — PREDNISOLONE SODIUM PHOSPHATE 1 %
1 DROPS OPHTHALMIC (EYE)
Qty: 0 | Refills: 0 | DISCHARGE

## 2022-05-02 RX ORDER — ALPRAZOLAM 0.25 MG
1 TABLET ORAL
Qty: 0 | Refills: 0 | DISCHARGE

## 2022-05-02 RX ORDER — SERTRALINE 25 MG/1
150 TABLET, FILM COATED ORAL
Qty: 0 | Refills: 0 | DISCHARGE

## 2022-05-02 RX ORDER — HYDRALAZINE HCL 50 MG
20 TABLET ORAL EVERY 8 HOURS
Refills: 0 | Status: DISCONTINUED | OUTPATIENT
Start: 2022-05-02 | End: 2022-05-03

## 2022-05-02 RX ORDER — LATANOPROST 0.05 MG/ML
1 SOLUTION/ DROPS OPHTHALMIC; TOPICAL
Qty: 0 | Refills: 0 | DISCHARGE

## 2022-05-02 RX ORDER — FUROSEMIDE 40 MG
20 TABLET ORAL DAILY
Refills: 0 | Status: DISCONTINUED | OUTPATIENT
Start: 2022-05-02 | End: 2022-05-05

## 2022-05-02 RX ORDER — DORZOLAMIDE HYDROCHLORIDE TIMOLOL MALEATE 20; 5 MG/ML; MG/ML
1 SOLUTION/ DROPS OPHTHALMIC
Refills: 0 | Status: DISCONTINUED | OUTPATIENT
Start: 2022-05-02 | End: 2022-05-05

## 2022-05-02 RX ORDER — ASPIRIN/CALCIUM CARB/MAGNESIUM 324 MG
81 TABLET ORAL
Refills: 0 | Status: DISCONTINUED | OUTPATIENT
Start: 2022-05-02 | End: 2022-05-05

## 2022-05-02 RX ORDER — ALPRAZOLAM 0.25 MG
0.5 TABLET ORAL EVERY 24 HOURS
Refills: 0 | Status: DISCONTINUED | OUTPATIENT
Start: 2022-05-02 | End: 2022-05-05

## 2022-05-02 RX ORDER — ROSUVASTATIN CALCIUM 5 MG/1
1 TABLET ORAL
Qty: 0 | Refills: 0 | DISCHARGE

## 2022-05-02 RX ORDER — CHOLECALCIFEROL (VITAMIN D3) 125 MCG
1 CAPSULE ORAL
Qty: 0 | Refills: 0 | DISCHARGE

## 2022-05-02 RX ORDER — SERTRALINE 25 MG/1
150 TABLET, FILM COATED ORAL DAILY
Refills: 0 | Status: DISCONTINUED | OUTPATIENT
Start: 2022-05-02 | End: 2022-05-05

## 2022-05-02 RX ORDER — ASPIRIN/CALCIUM CARB/MAGNESIUM 324 MG
1 TABLET ORAL
Qty: 0 | Refills: 0 | DISCHARGE

## 2022-05-02 RX ORDER — ASCORBIC ACID 60 MG
1 TABLET,CHEWABLE ORAL
Qty: 0 | Refills: 0 | DISCHARGE

## 2022-05-02 RX ORDER — GABAPENTIN 400 MG/1
1 CAPSULE ORAL
Qty: 0 | Refills: 0 | DISCHARGE

## 2022-05-02 RX ORDER — SODIUM CHLORIDE 9 MG/ML
500 INJECTION INTRAMUSCULAR; INTRAVENOUS; SUBCUTANEOUS
Refills: 0 | Status: DISCONTINUED | OUTPATIENT
Start: 2022-05-02 | End: 2022-05-02

## 2022-05-02 RX ORDER — POTASSIUM CHLORIDE 20 MEQ
10 PACKET (EA) ORAL
Refills: 0 | Status: COMPLETED | OUTPATIENT
Start: 2022-05-02 | End: 2022-05-02

## 2022-05-02 RX ORDER — VITAMIN E 100 UNIT
1 CAPSULE ORAL
Qty: 0 | Refills: 0 | DISCHARGE

## 2022-05-02 RX ORDER — SACUBITRIL AND VALSARTAN 24; 26 MG/1; MG/1
1 TABLET, FILM COATED ORAL
Refills: 0 | Status: DISCONTINUED | OUTPATIENT
Start: 2022-05-02 | End: 2022-05-05

## 2022-05-02 RX ORDER — ATORVASTATIN CALCIUM 80 MG/1
20 TABLET, FILM COATED ORAL AT BEDTIME
Refills: 0 | Status: DISCONTINUED | OUTPATIENT
Start: 2022-05-02 | End: 2022-05-05

## 2022-05-02 RX ORDER — METOPROLOL TARTRATE 50 MG
1 TABLET ORAL
Qty: 0 | Refills: 0 | DISCHARGE

## 2022-05-02 RX ORDER — CHOLECALCIFEROL (VITAMIN D3) 125 MCG
2000 CAPSULE ORAL DAILY
Refills: 0 | Status: DISCONTINUED | OUTPATIENT
Start: 2022-05-02 | End: 2022-05-05

## 2022-05-02 RX ORDER — ATROPINE SULFATE 1 %
1 DROPS OPHTHALMIC (EYE)
Qty: 0 | Refills: 0 | DISCHARGE

## 2022-05-02 RX ORDER — LATANOPROST 0.05 MG/ML
1 SOLUTION/ DROPS OPHTHALMIC; TOPICAL AT BEDTIME
Refills: 0 | Status: DISCONTINUED | OUTPATIENT
Start: 2022-05-02 | End: 2022-05-05

## 2022-05-02 RX ORDER — METOPROLOL TARTRATE 50 MG
25 TABLET ORAL DAILY
Refills: 0 | Status: DISCONTINUED | OUTPATIENT
Start: 2022-05-02 | End: 2022-05-05

## 2022-05-02 RX ORDER — DIPHENOXYLATE HCL/ATROPINE 2.5-.025MG
1 TABLET ORAL DAILY
Refills: 0 | Status: DISCONTINUED | OUTPATIENT
Start: 2022-05-02 | End: 2022-05-05

## 2022-05-02 RX ORDER — SERTRALINE 25 MG/1
1 TABLET, FILM COATED ORAL
Qty: 0 | Refills: 0 | DISCHARGE

## 2022-05-02 RX ORDER — FAMOTIDINE 10 MG/ML
1 INJECTION INTRAVENOUS
Qty: 0 | Refills: 0 | DISCHARGE

## 2022-05-02 RX ORDER — DIPHENOXYLATE HCL/ATROPINE 2.5-.025MG
2 TABLET ORAL
Qty: 0 | Refills: 0 | DISCHARGE

## 2022-05-02 RX ORDER — DIPHENOXYLATE HCL/ATROPINE 2.5-.025MG
1 TABLET ORAL
Qty: 0 | Refills: 0 | DISCHARGE

## 2022-05-02 RX ORDER — PREDNISOLONE SODIUM PHOSPHATE 1 %
1 DROPS OPHTHALMIC (EYE) DAILY
Refills: 0 | Status: DISCONTINUED | OUTPATIENT
Start: 2022-05-02 | End: 2022-05-05

## 2022-05-02 RX ADMIN — DORZOLAMIDE HYDROCHLORIDE TIMOLOL MALEATE 1 DROP(S): 20; 5 SOLUTION/ DROPS OPHTHALMIC at 06:18

## 2022-05-02 RX ADMIN — Medication 3 MILLILITER(S): at 04:54

## 2022-05-02 RX ADMIN — FAMOTIDINE 40 MILLIGRAM(S): 10 INJECTION INTRAVENOUS at 12:24

## 2022-05-02 RX ADMIN — HEPARIN SODIUM 5000 UNIT(S): 5000 INJECTION INTRAVENOUS; SUBCUTANEOUS at 05:47

## 2022-05-02 RX ADMIN — Medication 3 MILLILITER(S): at 10:31

## 2022-05-02 RX ADMIN — Medication 100 MILLIEQUIVALENT(S): at 16:48

## 2022-05-02 RX ADMIN — SACUBITRIL AND VALSARTAN 1 TABLET(S): 24; 26 TABLET, FILM COATED ORAL at 23:06

## 2022-05-02 RX ADMIN — Medication 100 MILLIEQUIVALENT(S): at 13:57

## 2022-05-02 RX ADMIN — LATANOPROST 1 DROP(S): 0.05 SOLUTION/ DROPS OPHTHALMIC; TOPICAL at 23:06

## 2022-05-02 RX ADMIN — SERTRALINE 150 MILLIGRAM(S): 25 TABLET, FILM COATED ORAL at 12:24

## 2022-05-02 RX ADMIN — Medication 20 MILLIGRAM(S): at 21:03

## 2022-05-02 RX ADMIN — Medication 40 MILLIGRAM(S): at 04:01

## 2022-05-02 RX ADMIN — Medication 10 MILLIGRAM(S): at 10:30

## 2022-05-02 RX ADMIN — Medication 100 MILLIEQUIVALENT(S): at 12:08

## 2022-05-02 RX ADMIN — Medication 20 MILLIGRAM(S): at 12:19

## 2022-05-02 RX ADMIN — HEPARIN SODIUM 5000 UNIT(S): 5000 INJECTION INTRAVENOUS; SUBCUTANEOUS at 18:47

## 2022-05-02 RX ADMIN — Medication 3 MILLILITER(S): at 23:23

## 2022-05-02 RX ADMIN — Medication 40 MILLIGRAM(S): at 01:42

## 2022-05-02 RX ADMIN — ATORVASTATIN CALCIUM 20 MILLIGRAM(S): 80 TABLET, FILM COATED ORAL at 21:03

## 2022-05-02 RX ADMIN — Medication 20 MILLIGRAM(S): at 12:18

## 2022-05-02 RX ADMIN — Medication 1 DROP(S): at 12:59

## 2022-05-02 RX ADMIN — Medication 2000 UNIT(S): at 12:24

## 2022-05-02 RX ADMIN — Medication 25 MILLIGRAM(S): at 12:18

## 2022-05-02 RX ADMIN — SACUBITRIL AND VALSARTAN 1 TABLET(S): 24; 26 TABLET, FILM COATED ORAL at 12:17

## 2022-05-02 RX ADMIN — DORZOLAMIDE HYDROCHLORIDE TIMOLOL MALEATE 1 DROP(S): 20; 5 SOLUTION/ DROPS OPHTHALMIC at 21:05

## 2022-05-02 NOTE — PROVIDER CONTACT NOTE (OTHER) - BACKGROUND
SW contacted by BRIAN Cazares who advised that pt's son was at bedside wanting information on obtaining more help at home for the pt.
Patient admitted with the flu. Hx of pace maker, glaucoma, IBS, vertigo. DNR/DNI

## 2022-05-02 NOTE — H&P ADULT - PROBLEM SELECTOR PLAN 8
F-none  E-replete prn  N-dysphagia diet formal speech swallow  heparin subQ DVT prophylaxis  DNR and DNI -c/w home regimen

## 2022-05-02 NOTE — H&P ADULT - HISTORY OF PRESENT ILLNESS
89 y/o F hx of irritable bowel syndrome, vertigo, depression, anxiety, MVP w/ hx of supraventricular tachycardia w/ [employer] on toprol, glaucoma, macular degeneration who presents due to sob and cough that started Tuesday. Endorses some dysphagia w/ large solids. She was given doxycycline w/o improvement. Has wheezing and sputum production. Tested positive for influenza A. Denies fevers, chills, nausea, vomiting, diarrhea, headaches, visual changes, cp, palpitations, LOC, LE swelling, dysuria, polyuria, melena, or hematochezia.     Chest xray negative for infiltrate 89 y/o F hx of irritable bowel syndrome, vertigo, depression, anxiety, MVP w/ hx of supraventricular tachycardia w/ [employer] on toprol, glaucoma, macular degeneration who presents due to sob and cough that started Tuesday. Endorses some dysphagia w/ large solids. She was given doxycycline w/o improvement. Has wheezing and sputum production. Tested positive for influenza A. Denies fevers, chills, nausea, vomiting, diarrhea, headaches, visual changes, cp, palpitations, LOC, LE swelling, dysuria, polyuria, melena, or hematochezia.     Chest xray negative for infiltrate  EKG paced rhythm 89 y/o F hx of irritable bowel syndrome, vertigo, depression, anxiety, MVP w/ hx of supraventricular tachycardia and afib w/ [employer] on toprol, glaucoma, macular degeneration who presents due to sob and cough that started Tuesday. Endorses some dysphagia w/ large solids. She was given doxycycline w/o improvement. Has wheezing and sputum production. Tested positive for influenza A. Denies fevers, chills, nausea, vomiting, diarrhea, headaches, visual changes, cp, palpitations, LOC, LE swelling, dysuria, polyuria, melena, or hematochezia.     Chest xray negative for infiltrate  EKG paced rhythm

## 2022-05-02 NOTE — PROVIDER CONTACT NOTE (OTHER) - ASSESSMENT
NITZA met with pt's son who advised that pt is currently living alone receiving home attendant services ([id number]) through [facility]. SW provided education on the process for increasing hours. Son also inquiring about [employer] post DC, which SW also provided additional education on. SW also discussed option for private pay which pt shared he knows a person who he can hire although would be financially difficult. SW to give sign-out to floor SW and CM for follow-up once unit is assigned.
VS stable as per flowsheet. Patient is A&o x3. HR normal. Patient denies any chest pain.

## 2022-05-02 NOTE — H&P ADULT - CONVERSATION DETAILS
Discussed w/ patient and son about code status. Patient is DNR and DNI, is okay with antibiotics, iv fluids, and sending to hospital as needed.

## 2022-05-02 NOTE — PATIENT PROFILE ADULT - FALL HARM RISK - HARM RISK INTERVENTIONS

## 2022-05-02 NOTE — H&P ADULT - PROBLEM SELECTOR PLAN 3
-c/w toprol daily currently NSR no tachycardia noted -irregular heart rhythm noted by nursing staff on monitor  -2 EKGs are paced and do not show this  -telemetry monitoring to see this -known chronic afib, no need for tele  -PMD had prior discussions w/ family, risk of fall and bleeding outweigh benefits of AC

## 2022-05-02 NOTE — H&P ADULT - PROBLEM SELECTOR PLAN 2
-wheezing auscultated no hx of tobacco use or asthma as a child  -can treat w/ prednisone 40 mg QD for 5 days  -albuterol standing Q6 for now given wheezing  -if d-dimer is elevated obtain CT PE (currently lower concern given no swelling on LE, on RA not tachycardic)    #Dysphagia  -dysphagia diet, obtain formal speech and swallow -wheezing auscultated no hx of tobacco use or asthma as a child  -can treat w/ prednisone 40 mg QD for 5 days  -albuterol standing Q6 for now given wheezing  -d-dimer is elevated, but pt's breathing improved with nebulizers and steroids can hold off on CT PE for now (no LE swelling noted)    #Dysphagia  -dysphagia diet, obtain formal speech and swallow -wheezing auscultated no hx of tobacco use or asthma as a child  -can treat w/ prednisone 40 mg QD for 5 days  -albuterol standing Q6 for now given wheezing  -d-dimer is elevated, but pt's breathing improved with nebulizers and steroids can hold off on CT PE for now (no LE swelling noted)    #Left pleural effusion  -obtain CT chest noncontrast    #Dysphagia  -dysphagia diet, obtain formal speech and swallow

## 2022-05-02 NOTE — H&P ADULT - PROBLEM SELECTOR PLAN 1
-flu positive  -lower concern for bacterial superimposition currently xray negative for infiltrate  -if oxygenation worsens, repeat chest xray and if infiltrate would need vanc (post flu MRSA coverage)  -add on procalcitonin if elevated start vanc and azithro  -out of window for tamiflu

## 2022-05-02 NOTE — ED ADULT NURSE REASSESSMENT NOTE - NS ED NURSE REASSESS COMMENT FT1
Dr garcia returned page and aware of pt AFIB on CM as well as episode of vomiting. Awaiting further orders

## 2022-05-02 NOTE — PROVIDER CONTACT NOTE (OTHER) - RECOMMENDATIONS
As per PA, will investigate further at this time to determine if needing tele. For now patient medicine as admitted with the flu

## 2022-05-02 NOTE — H&P ADULT - NSHPLABSRESULTS_GEN_ALL_CORE
LABS:                         10.4   5.60  )-----------( 156      ( 01 May 2022 21:29 )             32.4     05-01    133<L>  |  98  |  40<H>  ----------------------------<  121<H>  3.5   |  22  |  1.07    Ca    8.6      01 May 2022 21:29    TPro  6.5  /  Alb  3.6  /  TBili  0.6  /  DBili  x   /  AST  33<H>  /  ALT  24  /  AlkPhos  94  05-01

## 2022-05-02 NOTE — ED ADULT NURSE REASSESSMENT NOTE - NS ED NURSE REASSESS COMMENT FT1
Pt at baseline mental status, resting in stretcher. Pt refusing PO medications , states she is feeling nauseous and wants to wait for breakfast. ACP called and made aware, advised to reschedule medications for 9 am

## 2022-05-02 NOTE — H&P ADULT - PROBLEM SELECTOR PLAN 9
F-none  E-replete prn  N-dysphagia diet formal speech swallow  heparin subQ DVT prophylaxis  DNR and DNI

## 2022-05-02 NOTE — ED ADULT NURSE REASSESSMENT NOTE - NS ED NURSE REASSESS COMMENT FT1
Break coverage RN: Pt awake and alert, respirations are even and unlabored, sating at 97% on RA, NSR on cardiac monitor, son at bedside. Pt en route to CT at this time.

## 2022-05-02 NOTE — ED ADULT NURSE REASSESSMENT NOTE - NS ED NURSE REASSESS COMMENT FT1
Pt at baseline mentals status, resting comfortably in stretcher. Reporting relief in nausea. Safety maintained

## 2022-05-02 NOTE — PROVIDER CONTACT NOTE (OTHER) - ACTION/TREATMENT ORDERED:
As per PA, will investigate further at this time to determine if needing tele. For now patient medicine admit as admitted with the flu

## 2022-05-02 NOTE — ED ADULT NURSE REASSESSMENT NOTE - NS ED NURSE REASSESS COMMENT FT1
Pt at baseline mental status, pt noted to be in AFIB on CM, denies any new CP, dizziness, SOB. HR in 80s. Pt also noted to be nauseous with one episode of vomiting, ACP called and made aware of AFIB as well as pt vomiting, provided with number of attending to page. Attending paged, awaiting call back. Safety maintained, pt satting at 99% on RA. Pt repositioned and changed for comfort.

## 2022-05-02 NOTE — H&P ADULT - NSHPPHYSICALEXAM_GEN_ALL_CORE
PHYSICAL EXAM:  GENERAL: NAD, well-developed  HEAD:  Atraumatic, Normocephalic  EYES: EOMI, PERRLA, conjunctiva and sclera clear  NECK: Supple, No JVD  CHEST/LUNG: Diffuse wheezing auscultated, no diminished breath sounds or crackles  HEART: Regular rate and rhythm; No murmurs, rubs, or gallops  ABDOMEN: Soft, Nontender, Nondistended; Bowel sounds present  EXTREMITIES:  2+ Peripheral Pulses, No clubbing, cyanosis, or edema  PSYCH: AAOx2 to person and location  NEUROLOGY: non-focal  SKIN: No rashes or lesions

## 2022-05-02 NOTE — H&P ADULT - ASSESSMENT
89 y/o F hx of irritable bowel syndrome, vertigo, depression, anxiety, MVP w/ hx of supraventricular tachycardia w/ [employer] on toprol, glaucoma, macular degeneration who presents due to sob and cough that started Tuesday.

## 2022-05-03 LAB
ANION GAP SERPL CALC-SCNC: 17 MMOL/L — HIGH (ref 7–14)
BUN SERPL-MCNC: 46 MG/DL — HIGH (ref 7–23)
CALCIUM SERPL-MCNC: 9.2 MG/DL — SIGNIFICANT CHANGE UP (ref 8.4–10.5)
CHLORIDE SERPL-SCNC: 99 MMOL/L — SIGNIFICANT CHANGE UP (ref 98–107)
CO2 SERPL-SCNC: 17 MMOL/L — LOW (ref 22–31)
CREAT SERPL-MCNC: 1.16 MG/DL — SIGNIFICANT CHANGE UP (ref 0.5–1.3)
EGFR: 43 ML/MIN/1.73M2 — LOW
GLUCOSE SERPL-MCNC: 131 MG/DL — HIGH (ref 70–99)
HCT VFR BLD CALC: 32.3 % — LOW (ref 34.5–45)
HGB BLD-MCNC: 10.8 G/DL — LOW (ref 11.5–15.5)
MAGNESIUM SERPL-MCNC: 1.8 MG/DL — SIGNIFICANT CHANGE UP (ref 1.6–2.6)
MCHC RBC-ENTMCNC: 27.7 PG — SIGNIFICANT CHANGE UP (ref 27–34)
MCHC RBC-ENTMCNC: 33.4 GM/DL — SIGNIFICANT CHANGE UP (ref 32–36)
MCV RBC AUTO: 82.8 FL — SIGNIFICANT CHANGE UP (ref 80–100)
NRBC # BLD: 0 /100 WBCS — SIGNIFICANT CHANGE UP
NRBC # FLD: 0 K/UL — SIGNIFICANT CHANGE UP
PHOSPHATE SERPL-MCNC: 3.9 MG/DL — SIGNIFICANT CHANGE UP (ref 2.5–4.5)
PLATELET # BLD AUTO: 179 K/UL — SIGNIFICANT CHANGE UP (ref 150–400)
POTASSIUM SERPL-MCNC: 4 MMOL/L — SIGNIFICANT CHANGE UP (ref 3.5–5.3)
POTASSIUM SERPL-SCNC: 4 MMOL/L — SIGNIFICANT CHANGE UP (ref 3.5–5.3)
RBC # BLD: 3.9 M/UL — SIGNIFICANT CHANGE UP (ref 3.8–5.2)
RBC # FLD: 14.7 % — HIGH (ref 10.3–14.5)
SODIUM SERPL-SCNC: 133 MMOL/L — LOW (ref 135–145)
WBC # BLD: 7.93 K/UL — SIGNIFICANT CHANGE UP (ref 3.8–10.5)
WBC # FLD AUTO: 7.93 K/UL — SIGNIFICANT CHANGE UP (ref 3.8–10.5)

## 2022-05-03 RX ORDER — HYDRALAZINE HCL 50 MG
50 TABLET ORAL EVERY 8 HOURS
Refills: 0 | Status: DISCONTINUED | OUTPATIENT
Start: 2022-05-03 | End: 2022-05-05

## 2022-05-03 RX ADMIN — ATORVASTATIN CALCIUM 20 MILLIGRAM(S): 80 TABLET, FILM COATED ORAL at 21:45

## 2022-05-03 RX ADMIN — SACUBITRIL AND VALSARTAN 1 TABLET(S): 24; 26 TABLET, FILM COATED ORAL at 12:22

## 2022-05-03 RX ADMIN — Medication 20 MILLIGRAM(S): at 06:10

## 2022-05-03 RX ADMIN — SERTRALINE 150 MILLIGRAM(S): 25 TABLET, FILM COATED ORAL at 12:22

## 2022-05-03 RX ADMIN — HEPARIN SODIUM 5000 UNIT(S): 5000 INJECTION INTRAVENOUS; SUBCUTANEOUS at 06:08

## 2022-05-03 RX ADMIN — Medication 50 MILLIGRAM(S): at 14:31

## 2022-05-03 RX ADMIN — Medication 3 MILLILITER(S): at 10:06

## 2022-05-03 RX ADMIN — Medication 2000 UNIT(S): at 12:21

## 2022-05-03 RX ADMIN — Medication 3 MILLILITER(S): at 03:53

## 2022-05-03 RX ADMIN — Medication 3 MILLILITER(S): at 16:24

## 2022-05-03 RX ADMIN — HEPARIN SODIUM 5000 UNIT(S): 5000 INJECTION INTRAVENOUS; SUBCUTANEOUS at 17:05

## 2022-05-03 RX ADMIN — Medication 50 MILLIGRAM(S): at 21:45

## 2022-05-03 RX ADMIN — LATANOPROST 1 DROP(S): 0.05 SOLUTION/ DROPS OPHTHALMIC; TOPICAL at 21:46

## 2022-05-03 RX ADMIN — Medication 81 MILLIGRAM(S): at 08:33

## 2022-05-03 RX ADMIN — DORZOLAMIDE HYDROCHLORIDE TIMOLOL MALEATE 1 DROP(S): 20; 5 SOLUTION/ DROPS OPHTHALMIC at 08:33

## 2022-05-03 RX ADMIN — Medication 40 MILLIGRAM(S): at 02:09

## 2022-05-03 RX ADMIN — DORZOLAMIDE HYDROCHLORIDE TIMOLOL MALEATE 1 DROP(S): 20; 5 SOLUTION/ DROPS OPHTHALMIC at 21:44

## 2022-05-03 RX ADMIN — FAMOTIDINE 40 MILLIGRAM(S): 10 INJECTION INTRAVENOUS at 12:21

## 2022-05-03 RX ADMIN — Medication 25 MILLIGRAM(S): at 12:22

## 2022-05-03 RX ADMIN — Medication 3 MILLILITER(S): at 20:27

## 2022-05-03 RX ADMIN — Medication 20 MILLIGRAM(S): at 12:21

## 2022-05-03 NOTE — SWALLOW BEDSIDE ASSESSMENT ADULT - ADDITIONAL RECOMMENDATIONS
1. monitor for diet tolerance and reconsult this department as indicated/consider advancing diet if lower dentures become present.

## 2022-05-03 NOTE — SWALLOW BEDSIDE ASSESSMENT ADULT - SWALLOW EVAL: DIAGNOSIS
Mild oral stage deficits given thin liquids, puree and soft & bite sized solid marked by adequate bolus containment. prolonged bolus manipulation, prolonged mastication with reduced ability to break down soft & bite sized solids, and prolonged anterior-posterior transport. Mild lingual and anterior sulci residue noted post primary swallow given soft & bite sized solid requiring clinician cue to initiate thin liquid wash to clear. Pharyngeal stage marked by observed initiation of the pharyngeal swallow trigger judged via laryngeal palpation. No clinically overt s/sx of impaired airway protection observed for all PO trials

## 2022-05-03 NOTE — SWALLOW BEDSIDE ASSESSMENT ADULT - ORAL PHASE
Decreased anterior-posterior movement of the bolus/Delayed oral transit time/Stasis in anterior sulcus/Lingual stasis Within functional limits

## 2022-05-03 NOTE — CHART NOTE - NSCHARTNOTEFT_GEN_A_CORE
Discussed with Dr. Roman, recommends to discontinue IV solumedrol and transition to PO prednisone, order to taper down 5mg each day.

## 2022-05-03 NOTE — SWALLOW BEDSIDE ASSESSMENT ADULT - COMMENTS
Per H&P [facility] Attending Note 5/2/22: "89 y/o F hx of irritable bowel syndrome, vertigo, depression, anxiety, MVP w/ hx of supraventricular tachycardia and afib w/ [employer] on toprol, glaucoma, macular degeneration who presents due to sob and cough that started Tuesday. Endorses some dysphagia w/ large solids. She was given doxycycline w/o improvement. Has wheezing and sputum production. Tested positive for influenza A. Denies fevers, chills, nausea, vomiting, diarrhea, headaches, visual changes, cp, palpitations, LOC, LE swelling, dysuria, polyuria, melena, or hematochezia. Chest xray negative for infiltrate"     Patient received upright in bed, asleep, though easily arousable via verbal cues to awake and alert state, AAOx2. Patient noted with wet cough at baseline. Patient  with ability to follow 1-step commands and answer yes/no questions.

## 2022-05-03 NOTE — PROGRESS NOTE ADULT - SUBJECTIVE AND OBJECTIVE BOX
Patient is a 89y old  Female who presents with a chief complaint of sob/cough (02 May 2022 00:55)      INTERVAL HPI/OVERNIGHT EVENTS: c/o nausea    MEDICATIONS  (STANDING):  albuterol/ipratropium for Nebulization 3 milliLiter(s) Nebulizer every 6 hours  aspirin enteric coated 81 milliGRAM(s) Oral <User Schedule>  atorvastatin 20 milliGRAM(s) Oral at bedtime  cholecalciferol 2000 Unit(s) Oral daily  dorzolamide 2%/timolol 0.5% Ophthalmic Solution 1 Drop(s) Left EYE two times a day  famotidine    Tablet 40 milliGRAM(s) Oral daily  furosemide    Tablet 20 milliGRAM(s) Oral daily  heparin   Injectable 5000 Unit(s) SubCutaneous every 12 hours  hydrALAZINE 20 milliGRAM(s) Oral every 8 hours  latanoprost 0.005% Ophthalmic Solution 1 Drop(s) Both EYES at bedtime  methylPREDNISolone sodium succinate Injectable 40 milliGRAM(s) IV Push every 24 hours  metoprolol succinate ER 25 milliGRAM(s) Oral daily  prednisoLONE acetate 1% Suspension 1 Drop(s) Left EYE daily  sacubitril 24 mG/valsartan 26 mG 1 Tablet(s) Oral two times a day  sertraline 150 milliGRAM(s) Oral daily    MEDICATIONS  (PRN):  acetaminophen     Tablet .. 650 milliGRAM(s) Oral every 6 hours PRN Temp greater or equal to 38C (100.4F), Mild Pain (1 - 3)  ALPRAZolam 0.5 milliGRAM(s) Oral every 24 hours PRN anxiety  diphenoxylate/atropine 1 Tablet(s) Oral daily PRN Diarrhea  guaiFENesin Oral Liquid (Sugar-Free) 100 milliGRAM(s) Oral every 6 hours PRN Cough          Allergies    codeine (Other)  Motrin (Nausea)  Nitroglycerine-passed out (Other)  PCN (Anaphylaxis)  penicillins (Anaphylaxis)    Intolerances        REVIEW OF SYSTEMS:  CARDIOVASCULAR: No chest pain, palpitations, dizziness, or leg swelling; no shortness of breath     RESPIRATORY: No cough, wheezing, chills or hemoptysis; No shortness of breath    GASTROINTESTINAL: No abdominal or epigastric pain. + nausea, vomiting, or hematemesis; No diarrhea or constipation. No melena or hematochezia.    NEUROLOGICAL: No headaches, memory loss, loss of strength, numbness + Dizziness       PHYSICAL EXAM:  Vital Signs Last 24 Hrs  T(C): 36.9 (03 May 2022 06:00), Max: 36.9 (03 May 2022 06:00)  T(F): 98.4 (03 May 2022 06:00), Max: 98.4 (03 May 2022 06:00)  HR: 97 (03 May 2022 06:00) (62 - 97)  BP: 182/89 (03 May 2022 06:00) (149/86 - 184/92)  BP(mean): --  RR: 18 (03 May 2022 06:00) (18 - 20)  SpO2: 97% (03 May 2022 06:00) (95% - 100%)    GENERAL: NAD, well-groomed, well-developed  HEAD:  Atraumatic, Normocephalic  EYES: EOMI, PERRLA, conjunctiva and sclera clear  NECK: Supple, No JVD, Normal thyroid  NERVOUS SYSTEM:  Alert & Oriented X3, Good concentration;  and symmetric  CHEST/LUNG: Bilatteral wheeze   HEART: S1S2 regular, with II/VI High pitched ATTILA left base, without rub nor gallop  ABDOMEN: Soft, Nontender, Nondistended; Bowel sounds present  EXTREMITIES:  2+ Peripheral Pulses, No clubbing, cyanosis, or edema      LABS:                        10.8   7.93  )-----------( 179      ( 03 May 2022 07:21 )             32.3     03 May 2022 07:21    133    |  99     |  46     ----------------------------<  131    4.0     |  17     |  1.16     Ca    9.2        03 May 2022 07:21  Phos  3.9       03 May 2022 07:21  Mg     1.80      03 May 2022 07:21            Assessment:  · Assessment      89 y/o F hx of aortic stenosis, irritable bowel syndrome, vertigo, depression, anxiety, MVP w/ hx of supraventricular tachycardia w/ [employer] on toprol, glaucoma, macular degeneration who presents due to sob and cough that started Tuesday.    Problem/Plan - 1:  ·  Problem: Pneumonia due to influenza A virus.   ·  Plan: -flu positive  -lower concern for bacterial superimposition currently xray negative for infiltrate  -if oxygenation worsens, repeat chest xray and if infiltrate would need vanc (post flu MRSA coverage)    Problem/Plan - 2:  ·  Problem: Reactive airway disease.   ·  Plan: -wheezing auscultated no hx of tobacco use or asthma as a child  -can treat w/ prednisone 40 mg QD for 5 days  -albuterol standing Q6 for now given wheezing  -d-dimer is elevated, but pt's breathing improved with nebulizers and steroids can hold off on CT PE for now (no LE swelling noted)    #Left pleural effusion  see CT.  CHF vs secondary to flu?     #Dysphagia  -see rafaela and jeniffer     Problem/Plan - 3:  ·  Problem: Chronic atrial fibrillation.  ·  Plan: -known chronic afib, no need for tele  -PMD had prior discussions w/ family, risk of fall and bleeding outweigh benefits of AC.    Problem/Plan - 4:  ·  Problem: NSVT (nonsustained ventricular tachycardia).   ·  Plan: -c/w toprol daily currently NSR no tachycardia noted.    Problem/Plan - 5:  ·  Problem: Glaucoma.   ·  Plan: -c/w eye drops.    Problem/Plan - 6:  ·  Problem: HLD (hyperlipidemia).   ·  Plan: -c/w home medication therapeutic interchange.    Problem/Plan - 7:  CHF- on Entresto.  Given BUN, will advance Hydralizine     Problem/Plan - 8:  ·  Problem: Irritable bowel syndrome.   ·  Plan: -c/w home regimen.    Problem/Plan - 9:  ·  Problem: Nutrition, metabolism, and development symptoms.   encourage PO.  PRN Zofran.  Taper Steroids (doubt asthma)   heparin subQ DVT prophylaxis  DNR and DNI.      assessment:      Plan:       Care Discussed with Consultants/Other Providers:

## 2022-05-04 LAB
ANION GAP SERPL CALC-SCNC: 13 MMOL/L — SIGNIFICANT CHANGE UP (ref 7–14)
BUN SERPL-MCNC: 45 MG/DL — HIGH (ref 7–23)
CALCIUM SERPL-MCNC: 8.7 MG/DL — SIGNIFICANT CHANGE UP (ref 8.4–10.5)
CHLORIDE SERPL-SCNC: 99 MMOL/L — SIGNIFICANT CHANGE UP (ref 98–107)
CO2 SERPL-SCNC: 24 MMOL/L — SIGNIFICANT CHANGE UP (ref 22–31)
CREAT SERPL-MCNC: 1.12 MG/DL — SIGNIFICANT CHANGE UP (ref 0.5–1.3)
EGFR: 45 ML/MIN/1.73M2 — LOW
GLUCOSE SERPL-MCNC: 117 MG/DL — HIGH (ref 70–99)
HCT VFR BLD CALC: 32.3 % — LOW (ref 34.5–45)
HGB BLD-MCNC: 10.6 G/DL — LOW (ref 11.5–15.5)
MAGNESIUM SERPL-MCNC: 1.7 MG/DL — SIGNIFICANT CHANGE UP (ref 1.6–2.6)
MCHC RBC-ENTMCNC: 27.6 PG — SIGNIFICANT CHANGE UP (ref 27–34)
MCHC RBC-ENTMCNC: 32.8 GM/DL — SIGNIFICANT CHANGE UP (ref 32–36)
MCV RBC AUTO: 84.1 FL — SIGNIFICANT CHANGE UP (ref 80–100)
NRBC # BLD: 0 /100 WBCS — SIGNIFICANT CHANGE UP
NRBC # FLD: 0 K/UL — SIGNIFICANT CHANGE UP
PHOSPHATE SERPL-MCNC: 3.7 MG/DL — SIGNIFICANT CHANGE UP (ref 2.5–4.5)
PLATELET # BLD AUTO: 160 K/UL — SIGNIFICANT CHANGE UP (ref 150–400)
POTASSIUM SERPL-MCNC: 3.1 MMOL/L — LOW (ref 3.5–5.3)
POTASSIUM SERPL-SCNC: 3.1 MMOL/L — LOW (ref 3.5–5.3)
RBC # BLD: 3.84 M/UL — SIGNIFICANT CHANGE UP (ref 3.8–5.2)
RBC # FLD: 14.8 % — HIGH (ref 10.3–14.5)
SARS-COV-2 RNA SPEC QL NAA+PROBE: SIGNIFICANT CHANGE UP
SODIUM SERPL-SCNC: 136 MMOL/L — SIGNIFICANT CHANGE UP (ref 135–145)
WBC # BLD: 8.71 K/UL — SIGNIFICANT CHANGE UP (ref 3.8–10.5)
WBC # FLD AUTO: 8.71 K/UL — SIGNIFICANT CHANGE UP (ref 3.8–10.5)

## 2022-05-04 RX ORDER — MAGNESIUM SULFATE 500 MG/ML
2 VIAL (ML) INJECTION ONCE
Refills: 0 | Status: COMPLETED | OUTPATIENT
Start: 2022-05-04 | End: 2022-05-04

## 2022-05-04 RX ORDER — POTASSIUM CHLORIDE 20 MEQ
40 PACKET (EA) ORAL EVERY 4 HOURS
Refills: 0 | Status: COMPLETED | OUTPATIENT
Start: 2022-05-04 | End: 2022-05-04

## 2022-05-04 RX ORDER — ONDANSETRON 8 MG/1
4 TABLET, FILM COATED ORAL ONCE
Refills: 0 | Status: DISCONTINUED | OUTPATIENT
Start: 2022-05-04 | End: 2022-05-05

## 2022-05-04 RX ORDER — POTASSIUM CHLORIDE 20 MEQ
10 PACKET (EA) ORAL
Refills: 0 | Status: COMPLETED | OUTPATIENT
Start: 2022-05-04 | End: 2022-05-04

## 2022-05-04 RX ADMIN — Medication 100 MILLIEQUIVALENT(S): at 18:16

## 2022-05-04 RX ADMIN — HEPARIN SODIUM 5000 UNIT(S): 5000 INJECTION INTRAVENOUS; SUBCUTANEOUS at 17:05

## 2022-05-04 RX ADMIN — SACUBITRIL AND VALSARTAN 1 TABLET(S): 24; 26 TABLET, FILM COATED ORAL at 00:55

## 2022-05-04 RX ADMIN — Medication 3 MILLILITER(S): at 09:44

## 2022-05-04 RX ADMIN — Medication 100 MILLIGRAM(S): at 14:19

## 2022-05-04 RX ADMIN — Medication 3 MILLILITER(S): at 15:58

## 2022-05-04 RX ADMIN — Medication 25 MILLIGRAM(S): at 05:14

## 2022-05-04 RX ADMIN — SACUBITRIL AND VALSARTAN 1 TABLET(S): 24; 26 TABLET, FILM COATED ORAL at 23:10

## 2022-05-04 RX ADMIN — Medication 3 MILLILITER(S): at 21:10

## 2022-05-04 RX ADMIN — ATORVASTATIN CALCIUM 20 MILLIGRAM(S): 80 TABLET, FILM COATED ORAL at 23:09

## 2022-05-04 RX ADMIN — Medication 50 MILLIGRAM(S): at 23:10

## 2022-05-04 RX ADMIN — Medication 20 MILLIGRAM(S): at 05:12

## 2022-05-04 RX ADMIN — Medication 2000 UNIT(S): at 12:21

## 2022-05-04 RX ADMIN — Medication 100 MILLIGRAM(S): at 23:21

## 2022-05-04 RX ADMIN — Medication 3 MILLILITER(S): at 04:10

## 2022-05-04 RX ADMIN — Medication 40 MILLIEQUIVALENT(S): at 18:11

## 2022-05-04 RX ADMIN — SACUBITRIL AND VALSARTAN 1 TABLET(S): 24; 26 TABLET, FILM COATED ORAL at 12:22

## 2022-05-04 RX ADMIN — DORZOLAMIDE HYDROCHLORIDE TIMOLOL MALEATE 1 DROP(S): 20; 5 SOLUTION/ DROPS OPHTHALMIC at 20:28

## 2022-05-04 RX ADMIN — HEPARIN SODIUM 5000 UNIT(S): 5000 INJECTION INTRAVENOUS; SUBCUTANEOUS at 05:12

## 2022-05-04 RX ADMIN — Medication 50 MILLIGRAM(S): at 12:23

## 2022-05-04 RX ADMIN — SERTRALINE 150 MILLIGRAM(S): 25 TABLET, FILM COATED ORAL at 12:23

## 2022-05-04 RX ADMIN — LATANOPROST 1 DROP(S): 0.05 SOLUTION/ DROPS OPHTHALMIC; TOPICAL at 23:09

## 2022-05-04 RX ADMIN — Medication 100 MILLIEQUIVALENT(S): at 20:28

## 2022-05-04 RX ADMIN — Medication 25 GRAM(S): at 23:58

## 2022-05-04 RX ADMIN — Medication 50 MILLIGRAM(S): at 05:12

## 2022-05-04 RX ADMIN — FAMOTIDINE 40 MILLIGRAM(S): 10 INJECTION INTRAVENOUS at 12:24

## 2022-05-04 RX ADMIN — Medication 35 MILLIGRAM(S): at 08:33

## 2022-05-04 RX ADMIN — DORZOLAMIDE HYDROCHLORIDE TIMOLOL MALEATE 1 DROP(S): 20; 5 SOLUTION/ DROPS OPHTHALMIC at 08:39

## 2022-05-04 RX ADMIN — Medication 100 MILLIEQUIVALENT(S): at 19:18

## 2022-05-04 RX ADMIN — Medication 1 DROP(S): at 12:51

## 2022-05-04 NOTE — PROGRESS NOTE ADULT - SUBJECTIVE AND OBJECTIVE BOX
Patient is a 89y old  Female who presents with a chief complaint of sob/cough (03 May 2022 09:54)      INTERVAL HPI/OVERNIGHT EVENTS: none    MEDICATIONS  (STANDING):  albuterol/ipratropium for Nebulization 3 milliLiter(s) Nebulizer every 6 hours  aspirin enteric coated 81 milliGRAM(s) Oral <User Schedule>  atorvastatin 20 milliGRAM(s) Oral at bedtime  cholecalciferol 2000 Unit(s) Oral daily  dorzolamide 2%/timolol 0.5% Ophthalmic Solution 1 Drop(s) Left EYE two times a day  famotidine    Tablet 40 milliGRAM(s) Oral daily  furosemide    Tablet 20 milliGRAM(s) Oral daily  heparin   Injectable 5000 Unit(s) SubCutaneous every 12 hours  hydrALAZINE 50 milliGRAM(s) Oral every 8 hours  latanoprost 0.005% Ophthalmic Solution 1 Drop(s) Both EYES at bedtime  metoprolol succinate ER 25 milliGRAM(s) Oral daily  ondansetron Injectable 4 milliGRAM(s) IV Push once  prednisoLONE acetate 1% Suspension 1 Drop(s) Left EYE daily  predniSONE   Tablet   Oral   sacubitril 24 mG/valsartan 26 mG 1 Tablet(s) Oral two times a day  sertraline 150 milliGRAM(s) Oral daily    MEDICATIONS  (PRN):  acetaminophen     Tablet .. 650 milliGRAM(s) Oral every 6 hours PRN Temp greater or equal to 38C (100.4F), Mild Pain (1 - 3)  ALPRAZolam 0.5 milliGRAM(s) Oral every 24 hours PRN anxiety  diphenoxylate/atropine 1 Tablet(s) Oral daily PRN Diarrhea  guaiFENesin Oral Liquid (Sugar-Free) 100 milliGRAM(s) Oral every 6 hours PRN Cough        Allergies    codeine (Other)  Motrin (Nausea)  Nitroglycerine-passed out (Other)  PCN (Anaphylaxis)  penicillins (Anaphylaxis)    Intolerances        REVIEW OF SYSTEMS:  CARDIOVASCULAR: No chest pain, palpitations, dizziness, or leg swelling; no shortness of breath     RESPIRATORY: No cough, wheezing, chills or hemoptysis; No shortness of breath    GASTROINTESTINAL: No abdominal or epigastric pain. No nausea, vomiting, or hematemesis; No diarrhea or constipation. No melena or hematochezia.    NEUROLOGICAL: No headaches, memory loss, loss of strength, numbness      PHYSICAL EXAM:  Vital Signs Last 24 Hrs  T(C): 36.7 (04 May 2022 05:15), Max: 36.7 (04 May 2022 05:15)  T(F): 98.1 (04 May 2022 05:15), Max: 98.1 (04 May 2022 05:15)  HR: 79 (04 May 2022 05:15) (58 - 98)  BP: 160/80 (04 May 2022 05:15) (152/76 - 160/80)  BP(mean): --  RR: 18 (04 May 2022 05:15) (17 - 18)  SpO2: 100% (04 May 2022 05:15) (95% - 100%)    GENERAL:  pt comfortable, but weak, on RA    NAD, well-groomed, well-developed  HEAD:  Atraumatic, Normocephalic  EYES: EOMI, PERRLA, conjunctiva and sclera clear  NECK: Supple, No JVD, Normal thyroid  NERVOUS SYSTEM:  Alert & Oriented X3, Good concentration;  and symmetric  CHEST/LUNG: scattered wheezes.  No rales, rhonchi, wheezing, or rubs  HEART: S1S2 regular, with II/VI [place]  murmur left and right base, without rub nor gallop  ABDOMEN: Soft, Nontender, Nondistended; Bowel sounds present  EXTREMITIES:  2+ Peripheral Pulses, No clubbing, cyanosis, or edema      LABS:      Ca    9.2        03 May 2022 07:21        CAPILLARY BLOOD GLUCOSE      Assessment:  · Assessment	  89 y/o F with hx of aortic stenosis, irritable bowel syndrome, vertigo, depression, anxiety, MVP w/ hx of supraventricular tachycardia w/ [employer] on toprol, glaucoma, macular degeneration who presents due to sob and cough that started Tuesday.    Problem/Plan - 1:  ·  Problem: Pneumonia due to influenza A virus.   ·  Plan: -flu positive  -lower concern for bacterial superimposition currently xray negative for infiltrate  -if oxygenation worsens, repeat chest xray and if infiltrate would need vanc (post flu MRSA coverage)    Problem/Plan - 2:  ·  Problem: Reactive airway disease.   ·  Plan: -wheezing auscultated no hx of tobacco use or asthma as a child  the wheeze was most likely CHF.  Rapid Steroid taper.  see CT.  CHF vs secondary to flu?         Problem/Plan - 3:  ·  Problem: Chronic atrial fibrillation.  ·  Plan: -known chronic afib, no need for tele  -PMD had prior discussions w/ family, risk of fall and bleeding outweigh benefits of AC.         PM interogation   Problem/Plan - 4:  ·  Problem: NSVT (nonsustained ventricular tachycardia).   ·  Plan: -c/w toprol daily currently NSR no tachycardia noted.    Problem/Plan - 5:  ·  Problem: Glaucoma.   ·  Plan: -c/w eye drops.    Problem/Plan - 6:  ·  Problem: HLD (hyperlipidemia).   ·  Plan: -c/w home medication therapeutic interchange.    Problem/Plan - 7:  CHF- on Entresto.  Given BUN, will advance Hydralizine      Will advance Entresto pending today's labs     Problem/Plan - 8:  ·  Problem: Irritable bowel syndrome.   ·  Plan: -c/w home regimen.    Problem/Plan - 9:  ·  Problem: Nutrition, metabolism, and development symptoms.   encourage PO.  PRN Zofran.  Taper Steroids (doubt asthma)   heparin subQ DVT prophylaxis  DNR and DNI.    D/W ACP Patient is a 89y old  Female who presents with a chief complaint of sob/cough (03 May 2022 09:54)      INTERVAL HPI/OVERNIGHT EVENTS: none    MEDICATIONS  (STANDING):  albuterol/ipratropium for Nebulization 3 milliLiter(s) Nebulizer every 6 hours  aspirin enteric coated 81 milliGRAM(s) Oral <User Schedule>  atorvastatin 20 milliGRAM(s) Oral at bedtime  cholecalciferol 2000 Unit(s) Oral daily  dorzolamide 2%/timolol 0.5% Ophthalmic Solution 1 Drop(s) Left EYE two times a day  famotidine    Tablet 40 milliGRAM(s) Oral daily  furosemide    Tablet 20 milliGRAM(s) Oral daily  heparin   Injectable 5000 Unit(s) SubCutaneous every 12 hours  hydrALAZINE 50 milliGRAM(s) Oral every 8 hours  latanoprost 0.005% Ophthalmic Solution 1 Drop(s) Both EYES at bedtime  metoprolol succinate ER 25 milliGRAM(s) Oral daily  ondansetron Injectable 4 milliGRAM(s) IV Push once  prednisoLONE acetate 1% Suspension 1 Drop(s) Left EYE daily  predniSONE   Tablet   Oral   sacubitril 24 mG/valsartan 26 mG 1 Tablet(s) Oral two times a day  sertraline 150 milliGRAM(s) Oral daily    MEDICATIONS  (PRN):  acetaminophen     Tablet .. 650 milliGRAM(s) Oral every 6 hours PRN Temp greater or equal to 38C (100.4F), Mild Pain (1 - 3)  ALPRAZolam 0.5 milliGRAM(s) Oral every 24 hours PRN anxiety  diphenoxylate/atropine 1 Tablet(s) Oral daily PRN Diarrhea  guaiFENesin Oral Liquid (Sugar-Free) 100 milliGRAM(s) Oral every 6 hours PRN Cough        Allergies    codeine (Other)  Motrin (Nausea)  Nitroglycerine-passed out (Other)  PCN (Anaphylaxis)  penicillins (Anaphylaxis)    Intolerances        REVIEW OF SYSTEMS:  CARDIOVASCULAR: No chest pain, palpitations, dizziness, or leg swelling; no shortness of breath     RESPIRATORY: No cough, wheezing, chills or hemoptysis; No shortness of breath    GASTROINTESTINAL: No abdominal or epigastric pain. No nausea, vomiting, or hematemesis; No diarrhea or constipation. No melena or hematochezia.    NEUROLOGICAL: No headaches, memory loss, loss of strength, numbness      PHYSICAL EXAM:  Vital Signs Last 24 Hrs  T(C): 36.7 (04 May 2022 05:15), Max: 36.7 (04 May 2022 05:15)  T(F): 98.1 (04 May 2022 05:15), Max: 98.1 (04 May 2022 05:15)  HR: 79 (04 May 2022 05:15) (58 - 98)  BP: 160/80 (04 May 2022 05:15) (152/76 - 160/80)  BP(mean): --  RR: 18 (04 May 2022 05:15) (17 - 18)  SpO2: 100% (04 May 2022 05:15) (95% - 100%)    GENERAL:  pt comfortable, but weak, on RA    NAD, well-groomed, well-developed  HEAD:  Atraumatic, Normocephalic  EYES: EOMI, PERRLA, conjunctiva and sclera clear  NECK: Supple, No JVD, Normal thyroid  NERVOUS SYSTEM:  Alert & Oriented X3, Good concentration;  and symmetric  CHEST/LUNG: scattered wheezes.  No rales, rhonchi, wheezing, or rubs  HEART: S1S2 regular, with II/VI [place]  murmur left and right base, without rub nor gallop  ABDOMEN: Soft, Nontender, Nondistended; Bowel sounds present  EXTREMITIES:  2+ Peripheral Pulses, No clubbing, cyanosis, or edema      LABS:      Ca    9.2        03 May 2022 07:21        CAPILLARY BLOOD GLUCOSE      Assessment:  · Assessment	  89 y/o F with hx of aortic stenosis, irritable bowel syndrome, vertigo, depression, anxiety, MVP w/ hx of supraventricular tachycardia w/ [employer] on toprol, glaucoma, macular degeneration who presents due to sob and cough that started Tuesday.    Problem/Plan - 1:  ·  Problem: Pneumonia due to influenza A virus.   ·  Plan: -flu positive  -lower concern for bacterial superimposition currently xray negative for infiltrate  -if oxygenation worsens, repeat chest xray and if infiltrate would need vanc (post flu MRSA coverage)    Problem/Plan - 2:  ·  Problem: Reactive airway disease.   ·  Plan: -wheezing auscultated no hx of tobacco use or asthma as a child  the wheeze was most likely CHF.  Rapid Steroid taper.  see CT.  CHF vs secondary to flu?         Problem/Plan - 3:  ·  Problem: Chronic atrial fibrillation.  ·  Plan: -known chronic afib, no need for tele  -PMD had prior discussions w/ family, risk of fall and bleeding outweigh benefits of AC.         PM interogation   Problem/Plan - 4:  ·  Problem: NSVT (nonsustained ventricular tachycardia).   ·  Plan: -c/w toprol daily currently NSR no tachycardia noted.    Problem/Plan - 5:  ·  Problem: Glaucoma.   ·  Plan: -c/w eye drops.    Problem/Plan - 6:  ·  Problem: HLD (hyperlipidemia).   ·  Plan: -c/w home medication therapeutic interchange.    Problem/Plan - 7:  CHF- on Entresto.  Given BUN, will advance Hydralizine      Will advance Entresto pending today's labs     Problem/Plan - 8:  ·  Problem: Irritable bowel syndrome.   ·  Plan: -c/w home regimen.    Problem/Plan - 9:  ·  Problem: Nutrition, metabolism, and development symptoms.   encourage PO.  PRN Zofran.  Taper Steroids (doubt asthma)   heparin subQ DVT prophylaxis  DNR and DNI.    For PT eval, ? Rehab placement  D/W ACP and patient's son, Samuel, [phone]

## 2022-05-04 NOTE — PHYSICAL THERAPY INITIAL EVALUATION ADULT - ADDITIONAL COMMENTS
Patient lives alone in apartment, no steps to negotiate. Patient was getting assist with ADL prior to admission. Patient has an aide from 10-4pm. Patient was using walker and wheelchair to get around. Patient has shower chair and grab bars in bathroom.

## 2022-05-04 NOTE — PHYSICAL THERAPY INITIAL EVALUATION ADULT - PERTINENT HX OF CURRENT PROBLEM, REHAB EVAL
89 y/o F hx of irritable bowel syndrome, vertigo, depression, anxiety, MVP w/ hx of supraventricular tachycardia w/ [employer] on toprol, glaucoma, macular degeneration who presents due to sob and cough that started Tuesday.

## 2022-05-05 ENCOUNTER — TRANSCRIPTION ENCOUNTER (OUTPATIENT)
Age: 87
End: 2022-05-05

## 2022-05-05 VITALS
DIASTOLIC BLOOD PRESSURE: 92 MMHG | SYSTOLIC BLOOD PRESSURE: 163 MMHG | OXYGEN SATURATION: 98 % | HEART RATE: 68 BPM | TEMPERATURE: 99 F | RESPIRATION RATE: 18 BRPM

## 2022-05-05 LAB
ANION GAP SERPL CALC-SCNC: 15 MMOL/L — HIGH (ref 7–14)
BUN SERPL-MCNC: 42 MG/DL — HIGH (ref 7–23)
CALCIUM SERPL-MCNC: 8.5 MG/DL — SIGNIFICANT CHANGE UP (ref 8.4–10.5)
CHLORIDE SERPL-SCNC: 101 MMOL/L — SIGNIFICANT CHANGE UP (ref 98–107)
CO2 SERPL-SCNC: 21 MMOL/L — LOW (ref 22–31)
CREAT SERPL-MCNC: 1.08 MG/DL — SIGNIFICANT CHANGE UP (ref 0.5–1.3)
EGFR: 47 ML/MIN/1.73M2 — LOW
GLUCOSE SERPL-MCNC: 95 MG/DL — SIGNIFICANT CHANGE UP (ref 70–99)
HCT VFR BLD CALC: 32.7 % — LOW (ref 34.5–45)
HGB BLD-MCNC: 10.8 G/DL — LOW (ref 11.5–15.5)
MAGNESIUM SERPL-MCNC: 2.1 MG/DL — SIGNIFICANT CHANGE UP (ref 1.6–2.6)
MCHC RBC-ENTMCNC: 28 PG — SIGNIFICANT CHANGE UP (ref 27–34)
MCHC RBC-ENTMCNC: 33 GM/DL — SIGNIFICANT CHANGE UP (ref 32–36)
MCV RBC AUTO: 84.7 FL — SIGNIFICANT CHANGE UP (ref 80–100)
MRSA PCR RESULT.: SIGNIFICANT CHANGE UP
NRBC # BLD: 0 /100 WBCS — SIGNIFICANT CHANGE UP
NRBC # FLD: 0 K/UL — SIGNIFICANT CHANGE UP
PHOSPHATE SERPL-MCNC: 3.2 MG/DL — SIGNIFICANT CHANGE UP (ref 2.5–4.5)
PLATELET # BLD AUTO: 145 K/UL — LOW (ref 150–400)
POTASSIUM SERPL-MCNC: 3.5 MMOL/L — SIGNIFICANT CHANGE UP (ref 3.5–5.3)
POTASSIUM SERPL-SCNC: 3.5 MMOL/L — SIGNIFICANT CHANGE UP (ref 3.5–5.3)
RBC # BLD: 3.86 M/UL — SIGNIFICANT CHANGE UP (ref 3.8–5.2)
RBC # FLD: 14.7 % — HIGH (ref 10.3–14.5)
S AUREUS DNA NOSE QL NAA+PROBE: DETECTED
SODIUM SERPL-SCNC: 137 MMOL/L — SIGNIFICANT CHANGE UP (ref 135–145)
WBC # BLD: 8.28 K/UL — SIGNIFICANT CHANGE UP (ref 3.8–10.5)
WBC # FLD AUTO: 8.28 K/UL — SIGNIFICANT CHANGE UP (ref 3.8–10.5)

## 2022-05-05 PROCEDURE — 93280 PM DEVICE PROGR EVAL DUAL: CPT | Mod: 26

## 2022-05-05 RX ORDER — ALPRAZOLAM 0.25 MG
1 TABLET ORAL
Qty: 0 | Refills: 0 | DISCHARGE
Start: 2022-05-05

## 2022-05-05 RX ORDER — SACUBITRIL AND VALSARTAN 24; 26 MG/1; MG/1
1 TABLET, FILM COATED ORAL
Qty: 0 | Refills: 0 | DISCHARGE
Start: 2022-05-05

## 2022-05-05 RX ORDER — HYDRALAZINE HCL 50 MG
2 TABLET ORAL
Qty: 0 | Refills: 0 | DISCHARGE

## 2022-05-05 RX ORDER — ALPRAZOLAM 0.25 MG
1 TABLET ORAL
Qty: 0 | Refills: 0 | DISCHARGE

## 2022-05-05 RX ORDER — SACUBITRIL AND VALSARTAN 24; 26 MG/1; MG/1
1 TABLET, FILM COATED ORAL
Qty: 0 | Refills: 0 | DISCHARGE

## 2022-05-05 RX ORDER — FAMOTIDINE 10 MG/ML
1 INJECTION INTRAVENOUS
Qty: 0 | Refills: 0 | DISCHARGE
Start: 2022-05-05

## 2022-05-05 RX ORDER — SACUBITRIL AND VALSARTAN 24; 26 MG/1; MG/1
1 TABLET, FILM COATED ORAL
Refills: 0 | Status: DISCONTINUED | OUTPATIENT
Start: 2022-05-05 | End: 2022-05-05

## 2022-05-05 RX ORDER — HYDRALAZINE HCL 50 MG
1 TABLET ORAL
Qty: 0 | Refills: 0 | DISCHARGE
Start: 2022-05-05

## 2022-05-05 RX ORDER — IPRATROPIUM/ALBUTEROL SULFATE 18-103MCG
3 AEROSOL WITH ADAPTER (GRAM) INHALATION
Qty: 0 | Refills: 0 | DISCHARGE
Start: 2022-05-05

## 2022-05-05 RX ADMIN — Medication 3 MILLILITER(S): at 15:42

## 2022-05-05 RX ADMIN — Medication 3 MILLILITER(S): at 03:43

## 2022-05-05 RX ADMIN — Medication 30 MILLIGRAM(S): at 09:44

## 2022-05-05 RX ADMIN — Medication 81 MILLIGRAM(S): at 09:20

## 2022-05-05 RX ADMIN — SERTRALINE 150 MILLIGRAM(S): 25 TABLET, FILM COATED ORAL at 11:48

## 2022-05-05 RX ADMIN — Medication 3 MILLILITER(S): at 09:55

## 2022-05-05 RX ADMIN — DORZOLAMIDE HYDROCHLORIDE TIMOLOL MALEATE 1 DROP(S): 20; 5 SOLUTION/ DROPS OPHTHALMIC at 09:19

## 2022-05-05 RX ADMIN — Medication 50 MILLIGRAM(S): at 16:44

## 2022-05-05 RX ADMIN — FAMOTIDINE 40 MILLIGRAM(S): 10 INJECTION INTRAVENOUS at 11:49

## 2022-05-05 RX ADMIN — HEPARIN SODIUM 5000 UNIT(S): 5000 INJECTION INTRAVENOUS; SUBCUTANEOUS at 06:52

## 2022-05-05 RX ADMIN — Medication 20 MILLIGRAM(S): at 06:52

## 2022-05-05 RX ADMIN — Medication 1 DROP(S): at 11:49

## 2022-05-05 RX ADMIN — Medication 100 MILLIGRAM(S): at 06:51

## 2022-05-05 RX ADMIN — Medication 25 MILLIGRAM(S): at 06:52

## 2022-05-05 RX ADMIN — Medication 2000 UNIT(S): at 11:49

## 2022-05-05 RX ADMIN — Medication 50 MILLIGRAM(S): at 06:51

## 2022-05-05 NOTE — DISCHARGE NOTE PROVIDER - NSDCCPCAREPLAN_GEN_ALL_CORE_FT
PRINCIPAL DISCHARGE DIAGNOSIS  Diagnosis: Pneumonia due to influenza A virus  Assessment and Plan of Treatment: You were diagnosed with flu. You are currently off droplet precautions. No fevers. Continue cough medicine as needed. Eat a well balanced diet and continue oral hydration      SECONDARY DISCHARGE DIAGNOSES  Diagnosis: Shortness of breath  Assessment and Plan of Treatment: Likely due to flu, now resolved    Diagnosis: Reactive airway disease  Assessment and Plan of Treatment: Wheezing, may be die to CHF versus flu, now resolved, continue steroid taper as prescibed. Follow up with your primary care physician for further monitoring in 1-2 weeks. Please call to arrange appointment.    Diagnosis: Mild HTN  Assessment and Plan of Treatment: Entresto dose was increased.    Diagnosis: Aortic stenosis  Assessment and Plan of Treatment: Continue lasix as prescibed, follow up with cardiology Dr. Roman as outpatient. Continue Entresto, dose was increased due to elevated blood pressure    Diagnosis: HLD (hyperlipidemia)  Assessment and Plan of Treatment: Continue Crestor    Diagnosis: Chronic atrial fibrillation  Assessment and Plan of Treatment: Continue metoprolol    Diagnosis: Irritable bowel syndrome  Assessment and Plan of Treatment: Follow up with your primary care physician for further monitoring    Diagnosis: Glaucoma  Assessment and Plan of Treatment: Continue eye drops

## 2022-05-05 NOTE — PROGRESS NOTE ADULT - SUBJECTIVE AND OBJECTIVE BOX
Patient is a 89y old  Female who presents with a chief complaint of sob/cough (04 May 2022 09:01)      INTERVAL HPI/OVERNIGHT EVENTS: none     MEDICATIONS  (STANDING):  albuterol/ipratropium for Nebulization 3 milliLiter(s) Nebulizer every 6 hours  aspirin enteric coated 81 milliGRAM(s) Oral <User Schedule>  atorvastatin 20 milliGRAM(s) Oral at bedtime  benzonatate 100 milliGRAM(s) Oral three times a day  cholecalciferol 2000 Unit(s) Oral daily  dorzolamide 2%/timolol 0.5% Ophthalmic Solution 1 Drop(s) Left EYE two times a day  famotidine    Tablet 40 milliGRAM(s) Oral daily  furosemide    Tablet 20 milliGRAM(s) Oral daily  heparin   Injectable 5000 Unit(s) SubCutaneous every 12 hours  hydrALAZINE 50 milliGRAM(s) Oral every 8 hours  latanoprost 0.005% Ophthalmic Solution 1 Drop(s) Both EYES at bedtime  metoprolol succinate ER 25 milliGRAM(s) Oral daily  ondansetron Injectable 4 milliGRAM(s) IV Push once  prednisoLONE acetate 1% Suspension 1 Drop(s) Left EYE daily  predniSONE   Tablet   Oral   predniSONE   Tablet 30 milliGRAM(s) Oral every 24 hours  sacubitril 24 mG/valsartan 26 mG 1 Tablet(s) Oral two times a day  sertraline 150 milliGRAM(s) Oral daily    MEDICATIONS  (PRN):  acetaminophen     Tablet .. 650 milliGRAM(s) Oral every 6 hours PRN Temp greater or equal to 38C (100.4F), Mild Pain (1 - 3)  ALPRAZolam 0.5 milliGRAM(s) Oral every 24 hours PRN anxiety  diphenoxylate/atropine 1 Tablet(s) Oral daily PRN Diarrhea  guaiFENesin Oral Liquid (Sugar-Free) 100 milliGRAM(s) Oral every 6 hours PRN Cough          Allergies    codeine (Other)  Motrin (Nausea)  Nitroglycerine-passed out (Other)  PCN (Anaphylaxis)  penicillins (Anaphylaxis)    Intolerances        REVIEW OF SYSTEMS:  CARDIOVASCULAR: No chest pain, palpitations, dizziness, or leg swelling; no shortness of breath     RESPIRATORY: No cough, wheezing, chills or hemoptysis; No shortness of breath    GASTROINTESTINAL: No abdominal or epigastric pain. No nausea, vomiting, or hematemesis; No diarrhea or constipation. No melena or hematochezia.    NEUROLOGICAL: No headaches, memory loss, loss of strength, numbness      PHYSICAL EXAM:  Vital Signs Last 24 Hrs  T(C): 36.7 (05 May 2022 06:45), Max: 36.7 (05 May 2022 06:45)  T(F): 98 (05 May 2022 06:45), Max: 98 (05 May 2022 06:45)  HR: 59 (05 May 2022 06:45) (59 - 89)  BP: 163/90 (05 May 2022 06:45) (151/74 - 163/90)  BP(mean): --  RR: 18 (05 May 2022 06:45) (17 - 18)  SpO2: 97% (05 May 2022 06:45) (96% - 100%)    GENERAL: NAD, well-groomed, well-developed  HEAD:  Atraumatic, Normocephalic  EYES: EOMI, PERRLA, conjunctiva and sclera clear  NECK: Supple, No JVD, Normal thyroid  NERVOUS SYSTEM:  Alert & Oriented X3, Good concentration;  and symmetric  CHEST/LUNG: Bilatteral expiratory wheeze, without ronchi  or rubs  HEART: S1S2 II/VI [place]  murmur, rub nor gallop  ABDOMEN: Soft, Nontender, Nondistended; Bowel sounds present  EXTREMITIES:  2+ Peripheral Pulses, No clubbing, cyanosis, or edema      LABS:                        10.6   8.71  )-----------( 160      ( 04 May 2022 12:49 )             32.3     04 May 2022 12:49    136    |  99     |  45     ----------------------------<  117    3.1     |  24     |  1.12     Ca    8.7        04 May 2022 12:49  Phos  3.7       04 May 2022 12:49  Mg     1.70      04 May 2022 12:49    Assessment:  · Assessment	  89 y/o F with hx of aortic stenosis, irritable bowel syndrome, vertigo, depression, anxiety, MVP w/ hx of supraventricular tachycardia w/ [employer] on toprol, glaucoma, macular degeneration who presents due to sob and cough that started Tuesday.    Problem/Plan - 1:  ·  Problem: Pneumonia due to influenza A virus.   ·  Plan: -flu positive  -lower concern for bacterial superimposition currently xray negative for infiltrate  -if oxygenation worsens, repeat chest xray and if infiltrate would need vanc (post flu MRSA coverage)        pt remains comfortable on RA   Problem/Plan - 2:  ·  Problem: Reactive airway disease.   ·  Plan: -wheezing auscultated no hx of tobacco use or asthma as a child  the wheeze was most likely CHF.  Rapid Steroid taper.  see CT.  CHF vs secondary to flu?         Problem/Plan - 3:  ·  Problem: Chronic atrial fibrillation.  ·  Plan: -known chronic afib, no need for tele  -PMD had prior discussions w/ family, risk of fall and bleeding outweigh benefits of AC.         PM interogation   Problem/Plan - 4:  ·  Problem: NSVT (nonsustained ventricular tachycardia).   ·  Plan: -c/w toprol daily currently NSR no tachycardia noted.              she should have [facility] PM interogated prior to discharge     Problem/Plan - 5:  ·  Problem: Glaucoma.   ·  Plan: -c/w eye drops.    Problem/Plan - 6:  ·  Problem: HLD (hyperlipidemia).   ·  Plan: -c/w home medication therapeutic interchange.    Problem/Plan - 7:  CHF- on Entresto.  Given BP,      Will advance Entresto pending today's labs     Problem/Plan - 8:  ·  Problem: Irritable bowel syndrome.   ·  Plan: -c/w home regimen.    Problem/Plan - 9:  ·  Problem: Nutrition, metabolism, and development symptoms.   encourage PO.  PRN Zofran.  Taper Steroids (doubt asthma)   heparin subQ DVT prophylaxis  DNR and DNI.    For PT eval, ? Rehab placement  D/W ACP and patient's son, Samuel, [phone]

## 2022-05-05 NOTE — DISCHARGE NOTE NURSING/CASE MANAGEMENT/SOCIAL WORK - NSDCPEFALRISK_GEN_ALL_CORE
For information on Fall & Injury Prevention, visit: [email or web address] OR  [email or web address] OR  https://www.cdc.gov/steadi/patient.html

## 2022-05-05 NOTE — DISCHARGE NOTE PROVIDER - CARE PROVIDER_API CALL
Portillo Roman)  Internal Medicine  [address] [facility], [place] [address]  Phone: [phone]  Fax: [phone]  Follow Up Time:

## 2022-05-05 NOTE — DISCHARGE NOTE PROVIDER - NSDCFUSCHEDAPPT_GEN_ALL_CORE_FT
[facility] Physician Pending sale to [facility]  [facility] [phone]  Scheduled Appointment: 07/07/2022

## 2022-05-05 NOTE — PROVIDER CONTACT NOTE (MEDICATION) - ACTION/TREATMENT ORDERED:
JEWEL Eisenberg made aware. IV medications administered as per orders; pt tolerated well. Awaiting any additional action or treatment as per the provider.

## 2022-05-05 NOTE — DISCHARGE NOTE PROVIDER - HOSPITAL COURSE
89 y/o F with hx of aortic stenosis, irritable bowel syndrome, vertigo, depression, anxiety, MVP w/ hx of supraventricular tachycardia w/ [employer] on toprol, glaucoma, macular degeneration who presents due to sob and cough that started Tuesday.    Pneumonia due to influenza A virus - Flu + on RVP, now off isolation precautions given clinical improvement and afebrile. Cough improved, wheezing resolved s/p IV steroids and now on prednisone taper. Pt remains comfortable on RA. Not given Tamiflu on admission since outside window. Lower concern for bacterial superimposition currently xray negative for infiltrate. CT chest: Small left and trace right pleural effusions. Right upper lobe patchy groundglass opacities with septal thickening may represent pulmonary edema in the setting of pleural effusions and cardiomegaly versus infectious process from known influenza A.    Reactive airway disease -wheezing auscultated no hx of tobacco use or asthma as a child, the wheeze was most likely CHF.  Rapid Steroid taper.  see CT.  CHF vs secondary to flu?     Chronic atrial fibrillation-known chronic afib, no need for tele. PMD had prior discussions w/ family, risk of fall and bleeding outweigh benefits of AC.  PPM interrogation:   Sinus Bradycardia HR mid 40s  Appropriate pacing and sensing.  Excellent capture threshold.   Noted to have 3 episodes of SVT on 4/24/2022, 4/27/2022, and 4/28/2022 with ventriclar rate in the 70s. Patient has known hx of SVT and Afib.  No reprogramming done    CHF- on Entresto. Given elevated BP, Entresto dose increased. Hx aortic stenosis     PT eval: Rehab  Discussed with Dr. Roman, pt cleared for discharge to rehab.

## 2022-05-05 NOTE — DISCHARGE NOTE PROVIDER - NSDCMRMEDTOKEN_GEN_ALL_CORE_FT
ALPRAZolam 0.5 mg oral tablet: 1 tab(s) orally every 24 hours, As needed, anxiety  Aspirin Enteric Coated 81 mg oral delayed release tablet: 1 tab(s) orally once a day  benzonatate 100 mg oral capsule: 1 cap(s) orally 3 times a day as needed for cough  Crestor 5 mg oral tablet: 1 tab(s) orally once a day  diphenoxylate-atropine 2.5 mg-0.025 mg oral tablet: 1 tab(s) orally once a day as needed for diarrhea  dorzolamide-timolol 2%-0.5% preservative-free ophthalmic solution: 1 drop(s) to left eye 2 times a day  famotidine 40 mg oral tablet: 1 tab(s) orally once a day  furosemide 20 mg oral tablet: 1 tab(s) orally once a day  gabapentin 100 mg oral capsule: 1 tab(s) orally once a day as needed  guaiFENesin 100 mg/5 mL oral liquid: 5 milliliter(s) orally every 6 hours, As needed, Cough  hydrALAZINE 50 mg oral tablet: 1 tab(s) orally every 8 hours  ipratropium-albuterol 0.5 mg-2.5 mg/3 mL inhalation solution: 3 milliliter(s) inhaled every 6 hours, As Needed for wheezing   latanoprost 0.005% ophthalmic solution: 1 drop(s) to left eye once a day (in the evening)  metoprolol succinate 25 mg oral tablet, extended release: 1 tab(s) orally once a day  prednisoLONE acetate 1% ophthalmic suspension: 1 dose(s) to left eye once a day  sacubitril-valsartan 49 mg-51 mg oral tablet: 1 tab(s) orally 2 times a day  sertraline 150 mg oral capsule: 1 cap(s) orally once a day  Vitamin D3 50 mcg (2000 intl units) oral tablet: 1 tab(s) orally once a day

## 2022-05-05 NOTE — CHART NOTE - NSCHARTNOTEFT_GEN_A_CORE
Will d/c droplet precautions as pt has been afebrile >24 hours without anti-pyretics and with overall clinical improvement, wheezing has resolved and cough significantly improved, discussed with infection control.

## 2022-05-05 NOTE — DISCHARGE NOTE NURSING/CASE MANAGEMENT/SOCIAL WORK - NSDCVIVACCINE_GEN_ALL_CORE_FT
Tdap; 29-May-2018 21:39; Will Leone (BRIAN); [employer]; [id number]; IntraMuscular; Deltoid Left.; 0.5 milliLiter(s); VIS (VIS Published: 09-May-2013, VIS Presented: 29-May-2018);

## 2022-05-05 NOTE — PROCEDURE NOTE - ADDITIONAL PROCEDURE DETAILS
Appropriate pacing and sensing.  Excellent capture threshold.   Noted to have 3 episodes of SVT on 4/24/2022, 4/27/2022, and 4/28/2022 with ventriclar rate in the 70s  No reprogramming done Appropriate pacing and sensing.  Excellent capture threshold.   Noted to have 3 episodes of SVT on 4/24/2022, 4/27/2022, and 4/28/2022 with ventriclar rate in the 70s. Patient has known hx of SVT and Afib.  No reprogramming done

## 2022-05-05 NOTE — PROVIDER CONTACT NOTE (MEDICATION) - SITUATION
Patient was prescribed potassium powder to be mixed in water. Patient is refusing to drink the solution and spitting up medication.

## 2022-05-05 NOTE — DISCHARGE NOTE NURSING/CASE MANAGEMENT/SOCIAL WORK - PATIENT PORTAL LINK FT
You can access the FollowMyHealth Patient Portal offered by [facility] by registering at the following website: [email or web address]. By joining [facility]’s FollowMyHealth portal, you will also be able to view your health information using other applications (apps) compatible with our system.

## 2022-07-07 ENCOUNTER — APPOINTMENT (OUTPATIENT)
Dept: ELECTROPHYSIOLOGY | Facility: CLINIC | Age: 87
End: 2022-07-07

## 2022-07-28 ENCOUNTER — APPOINTMENT (OUTPATIENT)
Dept: ELECTROPHYSIOLOGY | Facility: CLINIC | Age: 87
End: 2022-07-28

## 2022-07-28 ENCOUNTER — FORM ENCOUNTER (OUTPATIENT)
Age: 87
End: 2022-07-28

## 2022-08-31 ENCOUNTER — FORM ENCOUNTER (OUTPATIENT)
Age: 87
End: 2022-08-31

## 2022-08-31 ENCOUNTER — APPOINTMENT (OUTPATIENT)
Dept: ELECTROPHYSIOLOGY | Facility: CLINIC | Age: 87
End: 2022-08-31

## 2022-11-15 NOTE — OCCUPATIONAL THERAPY INITIAL EVALUATION ADULT - SIT-TO-STAND BALANCE
Medical Necessity Clause: This procedure was medically necessary because the lesion that was treated was: Detail Level: Simple Size Of Lesion (*Required): 0.4 X Size Of Lesion Width In Cm (Optional): 0 Punch Size In Mm: 5 Repair Type: None Complex Requirements: Extensive Undermining Performed?: No Undermining Type: Entire Wound Debridement Text: The wound edges were debrided prior to proceeding with the closure to facilitate wound healing. Helical Rim Text: The closure involved the helical rim. Vermilion Border Text: The closure involved the vermilion border. Nostril Rim Text: The closure involved the nostril rim. Retention Suture Text: Retention sutures were placed to support the closure and prevent dehiscence. Anesthesia Type: 1% lidocaine with epinephrine Anesthesia Volume In Cc: 2 Hemostasis: Pressure Epidermal Sutures: 4-0 Ethilon Number Of Epidermal Sutures (Optional): 3 Epidermal Closure: simple interrupted Wound Care: Bacitracin Wound Dressings: a pressure dressing Suture Removal: 14 days Lab: [id number] Lab Facility: 97 1.5 Mm Punch Excision Text: A 1.5 mm punch biopsy was used to excise the lesion to the level of the subcutaneous fat.  Blunt dissection was used to free the lesion from the surrounding tissues and the lesion was removed. 2 Mm Punch Excision Text: A 2 mm punch biopsy was used to excise the lesion to the level of the subcutaneous fat.  Blunt dissection was used to free the lesion from the surrounding tissues and the lesion was removed. 2.5 Mm Punch Excision Text: A 2.5 mm punch biopsy was used to excise the lesion to the level of the subcutaneous fat.  Blunt dissection was used to free the lesion from the surrounding tissues and the lesion was removed. 3 Mm Punch Excision Text: A 3 mm punch biopsy was used to excise the lesion to the level of the subcutaneous fat.  Blunt dissection was used to free the lesion from the surrounding tissues and the lesion was removed. 3.5 Mm Punch Excision Text: A 3.5 mm punch biopsy was used to excise the lesion to the level of the subcutaneous fat.  Blunt dissection was used to free the lesion from the surrounding tissues and the lesion was removed. 4 Mm Punch Excision Text: A 4 mm punch biopsy was used to excise the lesion to the level of the subcutaneous fat.  Blunt dissection was used to free the lesion from the surrounding tissues and the lesion was removed. 4.5 Mm Punch Excision Text: A 4.5 mm punch biopsy was used to excise the lesion to the level of the subcutaneous fat.  Blunt dissection was used to free the lesion from the surrounding tissues and the lesion was removed. 5 Mm Punch Excision Text: A 5 mm punch biopsy was used to excise the lesion to the level of the subcutaneous fat.  Blunt dissection was used to free the lesion from the surrounding tissues and the lesion was removed. 6 Mm Punch Excision Text: A 6 mm punch biopsy was used to excise the lesion to the level of the subcutaneous fat.  Blunt dissection was used to free the lesion from the surrounding tissues and the lesion was removed. 7 Mm Punch Excision Text: A 7 mm punch biopsy was used to excise the lesion to the level of the subcutaneous fat.  Blunt dissection was used to free the lesion from the surrounding tissues and the lesion was removed. 8 Mm Punch Excision Text: A 8 mm punch biopsy was used to excise the lesion to the level of the subcutaneous fat.  Blunt dissection was used to free the lesion from the surrounding tissues and the lesion was removed. 10 Mm Punch Excision Text: A 10 mm punch biopsy was used to excise the lesion to the level of the subcutaneous fat.  Blunt dissection was used to free the lesion from the surrounding tissues and the lesion was removed. 12 Mm Punch Excision Text: A 12 mm punch biopsy was used to excise the lesion to the level of the subcutaneous fat.  Blunt dissection was used to free the lesion from the surrounding tissues and the lesion was removed. Consent was verbally obtained from the patient. The risks and benefits to therapy were discussed in detail. Specifically, the risks of infection, scarring, bleeding, prolonged wound healing, incomplete removal, allergy to anesthesia, nerve injury and recurrence were addressed. Prior to the procedure, the treatment site was clearly identified and confirmed by the patient. All components of Universal Protocol/PAUSE Rule completed. Post-Care Instructions: I reviewed with the patient in detail post-care instructions. Patient is to keep the biopsy site dry overnight, and then apply bacitracin twice daily until healed. Patient may apply hydrogen peroxide soaks to remove any crusting. Notification Instructions: Patient will be notified of biopsy results. However, patient instructed to call the office if not contacted within 2 weeks. Billing Type: Third-Party Bill poor plus

## 2022-12-29 ENCOUNTER — APPOINTMENT (OUTPATIENT)
Dept: ELECTROPHYSIOLOGY | Facility: CLINIC | Age: 87
End: 2022-12-29

## 2023-01-20 NOTE — PATIENT PROFILE ADULT - NSPROGENSOURCEINFO_GEN_A_NUR
family
ED w/u showing hypernatremia, mild leukocytosis, no clear source of infection   given fever/ams discussed LP w/ pt/family who declined given it is invasive  pt covered w/ abx and antiviral given possibility of meningitis/encephalitis  pt stable for admission for further tx, w/u and monitoring

## 2023-02-24 NOTE — ED ADULT NURSE NOTE - OBJECTIVE STATEMENT
Annemarie RN- Received pt in room 21. pt A&OX3, hard of hearing, son at beside. pt with hx of HTN, HLD. pt coming in from home c/o increased cough with sob x a few days. pt also c/o intermittent dizziness, states has vertigo and always feels dizzy. pt currently being treated for bronchitis, taking antibiotics. b/l wheezing noted. vitally stable. airway patent, respirations are equal and nonlabored, no respiratory distress noted, sating 99% on room air. NSR on monitor. denies chest pains at this time, no fever/chills, headache, dizziness, n/v/d. iv to be placed labs to be sent. pt stable, safety maintained. will endorse report to primary RN Reina for further plan.
Admission

## 2023-03-19 NOTE — PHYSICAL THERAPY INITIAL EVALUATION ADULT - IMPAIRMENTS FOUND, PT EVAL
His PSA continues to decrease and is in the normal range 1.10.  He has persistent microscopic hematuria.  I recommended that he continue to follow-up with Dr. Edwin Romero for his surveillance cystoscopies according to his recommendations.  I forwarded a copy of his lab work directly to him.   gait, locomotion, and balance

## 2023-03-24 NOTE — ED ADULT TRIAGE NOTE - BP NONINVASIVE SYSTOLIC (MM HG)
SUBJECTIVE/OBJECTIVE: Patient seen and examined in bed. awake, alert - engages in conversation, inconsistent with yes/no.      REVIEW OF SYSTEMS  +neurological deficits  +R hemiparesis  +Cognitive deficits     Vital Signs Last 24 Hrs  T(C): 36.6 (03-24-23 @ 08:19), Max: 36.6 (03-24-23 @ 08:19)  T(F): 97.8 (03-24-23 @ 08:19), Max: 97.8 (03-24-23 @ 08:19)  HR: 105 (03-24-23 @ 08:19) (88 - 105)  BP: 112/78 (03-24-23 @ 08:19) (103/72 - 112/78)  RR: 16 (03-24-23 @ 08:19) (16 - 17)  SpO2: 94% (03-24-23 @ 08:19) (94% - 96%)        RECENT LABS                       13.7   7.43  )-----------( 316      ( 23 Mar 2023 05:00 )             42.8     03-23    143  |  103  |  4<L>  ----------------------------<  112<H>  3.9   |  30  |  0.63    Ca    9.4      23 Mar 2023 05:00    TPro  7.0  /  Alb  3.3  /  TBili  0.4  /  DBili  x   /  AST  17  /  ALT  25  /  AlkPhos  107  03-23    CAPILLARY BLOOD GLUCOSE    PHYSICAL EXAM:  Constitutional -Restless, inconsistent with yes/no, follow some commands  HEENT - EOMI, left hemicraniectomy, sutures removed 3/7- helmet in place    Neck - s/p trach decannulation (guaze over stoma)  Chest -  CTA, no dyspnea or accessory mm use  Cardio -RRR  Abdomen -  Soft, NTND, +PEG, Bone flap marsupialized in epigastric region.  Extremities - No peripheral edema, No calf tenderness   Neurologic Exam:                    Cognitive -             Orientation: Awake, Alert, unable to assess orientation due to aphasia and cognitive deficits.             Communication- Limited command following - (unable to show 2 fingers or touch nose), Non-fluent, unable to repeat.            Attention:  Impaired; makes eye contact but follows commands inconsistently            Memory: Impaired            Thought: Impaired, Impulsive     Speech - non-verbal -s/p trach, impaired comprehension, +aphasia     Cranial Nerves - Limited due to comprehension impairment and aphasia; No facial asymmetry, Tongue midline, EOMI, Pupils dilated but reactive, +dysphagia     Motor -   wearing RUE brace.                    LEFT    UE - at least 3/5 and moving spontaneously; exam limited by command following;  WNL                    RIGHT UE - ShAB 0/5, EF 0/5, EE 0/5, WE 0/5,  0/5-- MAS 1+ finger flexors                     LEFT    LE -  at least 3/5 and moving spontaneously; exam limited by command following                    RIGHT LE - HE 1/5, Hip adductor 1/5, KE 0/5, DF 0/5, PF 0/5   heel with callous     Sensory - decrease sensation to LT-- RUE     Coordination - FTN / HTS impaired on right  Psychiatric - depressed, frustrated.  Restless at times.    MEDICATIONS  (STANDING):  AQUAPHOR (petrolatum Ointment) 1 Application(s) Topical two times a day  AQUAPHOR (petrolatum Ointment) 1 Application(s) Topical two times a day  atorvastatin 80 milliGRAM(s) Oral at bedtime  bacitracin   Ointment 1 Application(s) Topical two times a day  bacitracin   Ointment 1 Application(s) Topical daily  donepezil 5 milliGRAM(s) Oral daily  enoxaparin Injectable 90 milliGRAM(s) SubCutaneous <User Schedule>  escitalopram 10 milliGRAM(s) Oral <User Schedule>  gabapentin 600 milliGRAM(s) Oral at bedtime  hydrocortisone 1% Cream 1 Application(s) Topical two times a day  melatonin 6 milliGRAM(s) Oral at bedtime  midodrine. 10 milliGRAM(s) Oral <User Schedule>  nystatin Powder 1 Application(s) Topical two times a day  pantoprazole   Suspension 40 milliGRAM(s) Oral daily  polyethylene glycol 3350 17 Gram(s) Oral daily  senna Syrup 5 milliLiter(s) Oral at bedtime  traZODone 100 milliGRAM(s) Oral at bedtime    MEDICATIONS  (PRN):  acetaminophen    Suspension .. 650 milliGRAM(s) Oral every 6 hours PRN Temp greater or equal to 38C (100.4F), Mild Pain (1 - 3)   SUBJECTIVE/OBJECTIVE: Patient seen and examined in bed. awake, alert - engages in conversation, inconsistent with yes/no. Can be easily agitated.  *** tachycardiac during therapy to 150 bpm, at rest went down to 130.  EKG with sinus tach.  Poor historian to express his symptoms    REVIEW OF SYSTEMS  +neurological deficits  +R hemiparesis  +Cognitive deficits     Vital Signs Last 24 Hrs  T(C): 36.6 (03-24-23 @ 08:19), Max: 36.6 (03-24-23 @ 08:19)  T(F): 97.8 (03-24-23 @ 08:19), Max: 97.8 (03-24-23 @ 08:19)  HR: 105 (03-24-23 @ 08:19) (88 - 105)  BP: 112/78 (03-24-23 @ 08:19) (103/72 - 112/78)  RR: 16 (03-24-23 @ 08:19) (16 - 17)  SpO2: 94% (03-24-23 @ 08:19) (94% - 96%)        RECENT LABS                       13.7   7.43  )-----------( 316      ( 23 Mar 2023 05:00 )             42.8     03-23    143  |  103  |  4<L>  ----------------------------<  112<H>  3.9   |  30  |  0.63    Ca    9.4      23 Mar 2023 05:00    TPro  7.0  /  Alb  3.3  /  TBili  0.4  /  DBili  x   /  AST  17  /  ALT  25  /  AlkPhos  107  03-23      PHYSICAL EXAM:  Constitutional -Restless, inconsistent with yes/no, follow some commands  HEENT - EOMI, left hemicraniectomy, sutures removed 3/7- helmet in place    Neck - s/p trach decannulation (guaze over stoma)  Chest -  CTA, no dyspnea or accessory mm use  Cardio -RRR  Abdomen -  Soft, NTND, +PEG, Bone flap marsupialized in epigastric region.  Extremities - No peripheral edema, No calf tenderness   Neurologic Exam:                    Cognitive -             Orientation: Awake, Alert; aphasia and cognitive deficits - inconsistent yes/no            Communication- Limited command following - (unable to show 2 fingers or touch nose), Non-fluent, unable to repeat.            Attention:  Impaired; makes eye contact but follows commands inconsistently     Speech - non-verbal -s/p trach, impaired comprehension, +aphasia     Cranial Nerves - Limited due to comprehension impairment and aphasia; No facial asymmetry, Tongue midline, EOMI, Pupils dilated but reactive, +dysphagia     Motor -   wearing RUE brace.                    LEFT    UE - at least 3/5 and moving spontaneously; exam limited by command following;  WNL                    RIGHT UE - ShAB 0/5, EF 0/5, EE 0/5, WE 0/5,  0/5-- MAS 1+ finger flexors                     LEFT    LE -  at least 3/5 and moving spontaneously; exam limited by command following                    RIGHT LE - HE 1/5, Hip adductor 1/5, KE 0/5, DF 0/5, PF 0/5   heel with callous     Sensory - decrease sensation to LT-- RUE     Coordination - FTN / HTS impaired on right  Psychiatric - depressed, frustrated.  Restless at times.    MEDICATIONS  (STANDING):  AQUAPHOR (petrolatum Ointment) 1 Application(s) Topical two times a day  AQUAPHOR (petrolatum Ointment) 1 Application(s) Topical two times a day  atorvastatin 80 milliGRAM(s) Oral at bedtime  bacitracin   Ointment 1 Application(s) Topical two times a day  bacitracin   Ointment 1 Application(s) Topical daily  donepezil 5 milliGRAM(s) Oral daily  enoxaparin Injectable 90 milliGRAM(s) SubCutaneous <User Schedule>  escitalopram 10 milliGRAM(s) Oral <User Schedule>  gabapentin 600 milliGRAM(s) Oral at bedtime  hydrocortisone 1% Cream 1 Application(s) Topical two times a day  melatonin 6 milliGRAM(s) Oral at bedtime  midodrine. 10 milliGRAM(s) Oral <User Schedule>  nystatin Powder 1 Application(s) Topical two times a day  pantoprazole   Suspension 40 milliGRAM(s) Oral daily  polyethylene glycol 3350 17 Gram(s) Oral daily  senna Syrup 5 milliLiter(s) Oral at bedtime  traZODone 100 milliGRAM(s) Oral at bedtime    MEDICATIONS  (PRN):  acetaminophen    Suspension .. 650 milliGRAM(s) Oral every 6 hours PRN Temp greater or equal to 38C (100.4F), Mild Pain (1 - 3)   SUBJECTIVE/OBJECTIVE: Patient seen and examined in bed. awake, alert - engages in conversation, inconsistent with yes/no. Can be easily agitated.  *** tachycardiac during therapy to 150 bpm, at rest went down to 130.  EKG with sinus tach.  Poor historian to express his symptoms    REVIEW OF SYSTEMS  +neurological deficits  +R hemiparesis  +Cognitive deficits     Vital Signs Last 24 Hrs  T(C): 36.6 (03-24-23 @ 08:19), Max: 36.6 (03-24-23 @ 08:19)  T(F): 97.8 (03-24-23 @ 08:19), Max: 97.8 (03-24-23 @ 08:19)  HR: 105 (03-24-23 @ 08:19) (88 - 105)  BP: 112/78 (03-24-23 @ 08:19) (103/72 - 112/78)  RR: 16 (03-24-23 @ 08:19) (16 - 17)  SpO2: 94% (03-24-23 @ 08:19) (94% - 96%)      RECENT LABS                       13.7   7.43  )-----------( 316      ( 23 Mar 2023 05:00 )             42.8     03-23    143  |  103  |  4<L>  ----------------------------<  112<H>  3.9   |  30  |  0.63    Ca    9.4      23 Mar 2023 05:00    TPro  7.0  /  Alb  3.3  /  TBili  0.4  /  DBili  x   /  AST  17  /  ALT  25  /  AlkPhos  107  03-23      PHYSICAL EXAM:  Constitutional -Restless, inconsistent with yes/no, follow some commands  HEENT - EOMI, left hemicraniectomy, sutures removed 3/7- helmet in place    Neck - s/p trach decannulation (guaze over stoma)  Chest -  CTA, no dyspnea or accessory mm use  Cardio -RRR  Abdomen -  Soft, NTND, +PEG, Bone flap marsupialized in epigastric region.  Extremities - No peripheral edema, No calf tenderness   Neurologic Exam:                    Cognitive -             Orientation: Awake, Alert; aphasia and cognitive deficits - inconsistent yes/no            Communication- Limited command following - (unable to show 2 fingers or touch nose), Non-fluent, unable to repeat.            Attention:  Impaired; makes eye contact but follows commands inconsistently     Speech - non-verbal -s/p trach, impaired comprehension, +aphasia     Cranial Nerves - Limited due to comprehension impairment and aphasia; No facial asymmetry, Tongue midline, EOMI, Pupils dilated but reactive, +dysphagia     Motor -   wearing RUE brace.                    LEFT    UE - at least 3/5 and moving spontaneously; exam limited by command following;  WNL                    RIGHT UE - ShAB 0/5, EF 0/5, EE 0/5, WE 0/5,  0/5-- MAS 1+ finger flexors                     LEFT    LE -  at least 3/5 and moving spontaneously; exam limited by command following                    RIGHT LE - HE 1/5, Hip adductor 1/5, KE 0/5, DF 0/5, PF 0/5   heel with callous     Sensory - decrease sensation to LT-- RUE     Coordination - FTN / HTS impaired on right  Psychiatric - depressed, frustrated.  Restless at times.    MEDICATIONS  (STANDING):  AQUAPHOR (petrolatum Ointment) 1 Application(s) Topical two times a day  AQUAPHOR (petrolatum Ointment) 1 Application(s) Topical two times a day  atorvastatin 80 milliGRAM(s) Oral at bedtime  bacitracin   Ointment 1 Application(s) Topical two times a day  bacitracin   Ointment 1 Application(s) Topical daily  donepezil 5 milliGRAM(s) Oral daily  enoxaparin Injectable 90 milliGRAM(s) SubCutaneous <User Schedule>  escitalopram 10 milliGRAM(s) Oral <User Schedule>  gabapentin 600 milliGRAM(s) Oral at bedtime  hydrocortisone 1% Cream 1 Application(s) Topical two times a day  melatonin 6 milliGRAM(s) Oral at bedtime  midodrine. 10 milliGRAM(s) Oral <User Schedule>  nystatin Powder 1 Application(s) Topical two times a day  pantoprazole   Suspension 40 milliGRAM(s) Oral daily  polyethylene glycol 3350 17 Gram(s) Oral daily  senna Syrup 5 milliLiter(s) Oral at bedtime  traZODone 100 milliGRAM(s) Oral at bedtime    MEDICATIONS  (PRN):  acetaminophen    Suspension .. 650 milliGRAM(s) Oral every 6 hours PRN Temp greater or equal to 38C (100.4F), Mild Pain (1 - 3)   SUBJECTIVE/OBJECTIVE: Patient seen and examined in bed. awake, alert - engages in conversation, inconsistent with yes/no. tachycardiac during therapy to 150 bpm, at rest went down to 130.  EKG with sinus tach.  Poor historian to express his symptoms. Participated better in therapy today and making progress as per therapists.  Pt. brought back from PT early due to tachycardia.  EKG seen by hospitalist.  Pt. cooperative with staff.  Noted to get agitated when speaking to sister on phone-- was able to be redirected and calm down.  did not need Zyprexa.     REVIEW OF SYSTEMS  +neurological deficits  +R hemiparesis  +Cognitive deficits     Vital Signs Last 24 Hrs  T(C): 36.6 (03-24-23 @ 08:19), Max: 36.6 (03-24-23 @ 08:19)  T(F): 97.8 (03-24-23 @ 08:19), Max: 97.8 (03-24-23 @ 08:19)  HR: 105 (03-24-23 @ 08:19) (88 - 105)  BP: 112/78 (03-24-23 @ 08:19) (103/72 - 112/78)  RR: 16 (03-24-23 @ 08:19) (16 - 17)  SpO2: 94% (03-24-23 @ 08:19) (94% - 96%)      RECENT LABS                       13.7   7.43  )-----------( 316      ( 23 Mar 2023 05:00 )             42.8     03-23    143  |  103  |  4<L>  ----------------------------<  112<H>  3.9   |  30  |  0.63    Ca    9.4      23 Mar 2023 05:00    TPro  7.0  /  Alb  3.3  /  TBili  0.4  /  DBili  x   /  AST  17  /  ALT  25  /  AlkPhos  107  03-23      PHYSICAL EXAM:  Constitutional -Restless, inconsistent with yes/no, follow some commands  HEENT - EOMI, left hemicraniectomy, sutures removed 3/7- helmet in place    Neck - s/p trach decannulation (guaze over stoma)  Chest -  CTA, no dyspnea or accessory mm use  Cardio -RRR  Abdomen -  Soft, NTND, +PEG, Bone flap marsupialized in epigastric region.  Extremities - No peripheral edema, No calf tenderness   Neurologic Exam:                    Cognitive -             Orientation: Awake, Alert; aphasia and cognitive deficits - inconsistent yes/no            Communication- Limited command following - (unable to show 2 fingers or touch nose), Non-fluent, unable to repeat.            Attention:  Impaired; makes eye contact but follows commands inconsistently     Speech - non-verbal -s/p trach, impaired comprehension, +aphasia     Cranial Nerves - Limited due to comprehension impairment and aphasia; No facial asymmetry, Tongue midline, EOMI, Pupils dilated but reactive, +dysphagia     Motor -   wearing RUE brace.                    LEFT    UE - at least 3/5 and moving spontaneously; exam limited by command following;  WNL                    RIGHT UE - ShAB 0/5, EF 0/5, EE 0/5, WE 0/5,  0/5-- MAS 1+ finger flexors                     LEFT    LE -  at least 3/5 and moving spontaneously; exam limited by command following                    RIGHT LE - HE 1/5, Hip adductor 1/5, KE 0/5, DF 0/5, PF 0/5   heel with callous     Sensory - decrease sensation to LT-- RUE     Coordination - FTN / HTS impaired on right  Psychiatric - depressed, frustrated.  Restless at times.    MEDICATIONS  (STANDING):  AQUAPHOR (petrolatum Ointment) 1 Application(s) Topical two times a day  AQUAPHOR (petrolatum Ointment) 1 Application(s) Topical two times a day  atorvastatin 80 milliGRAM(s) Oral at bedtime  bacitracin   Ointment 1 Application(s) Topical two times a day  bacitracin   Ointment 1 Application(s) Topical daily  donepezil 5 milliGRAM(s) Oral daily  enoxaparin Injectable 90 milliGRAM(s) SubCutaneous <User Schedule>  escitalopram 10 milliGRAM(s) Oral <User Schedule>  gabapentin 600 milliGRAM(s) Oral at bedtime  hydrocortisone 1% Cream 1 Application(s) Topical two times a day  melatonin 6 milliGRAM(s) Oral at bedtime  midodrine. 10 milliGRAM(s) Oral <User Schedule>  nystatin Powder 1 Application(s) Topical two times a day  pantoprazole   Suspension 40 milliGRAM(s) Oral daily  polyethylene glycol 3350 17 Gram(s) Oral daily  senna Syrup 5 milliLiter(s) Oral at bedtime  traZODone 100 milliGRAM(s) Oral at bedtime    MEDICATIONS  (PRN):  acetaminophen    Suspension .. 650 milliGRAM(s) Oral every 6 hours PRN Temp greater or equal to 38C (100.4F), Mild Pain (1 - 3)   170

## 2024-04-11 NOTE — OCCUPATIONAL THERAPY INITIAL EVALUATION ADULT - HEALTH SCREEN CRITERIA
When do you want to schedule pt? Do you want update XR or CT ?     Ct chest pulmonary 03-31-24=  IMPRESSION:  1. Negative for pulmonary embolus.  2. Interval worsening of multifocal right-sided atypical infectious versus inflammatory bronchiolitis.   yes

## 2024-05-28 NOTE — PATIENT PROFILE ADULT - NSPROIMPLANTSMEDDEV_GEN_A_NUR
"Request for medication refill: Azelastine HCl 137 MCG/SPRAY SOLN     Providers if patient needs an appointment and you are willing to give a one month supply please refill for one month and  send a letter/MyChart using \".SMILLIMITEDREFILL\" .smillimited and route chart to \"[facility] \" (Giving one month refill in non controlled medications is strongly recommended before denial)    If refill has been denied, meaning absolutely no refills without visit, please complete the smart phrase \".smirxrefuse\" and route it to the \"[facility] MED REFILLS\"  pool to inform the patient and the pharmacy.    Sukhdev Peters, CMA    "
Pacemaker
